# Patient Record
Sex: MALE | Race: WHITE | Employment: FULL TIME | URBAN - NONMETROPOLITAN AREA
[De-identification: names, ages, dates, MRNs, and addresses within clinical notes are randomized per-mention and may not be internally consistent; named-entity substitution may affect disease eponyms.]

---

## 2017-05-16 ENCOUNTER — OFFICE VISIT (OUTPATIENT)
Dept: PRIMARY CARE CLINIC | Age: 52
End: 2017-05-16
Payer: COMMERCIAL

## 2017-05-16 VITALS
WEIGHT: 256 LBS | BODY MASS INDEX: 34.67 KG/M2 | HEIGHT: 72 IN | OXYGEN SATURATION: 98 % | TEMPERATURE: 98.3 F | DIASTOLIC BLOOD PRESSURE: 88 MMHG | HEART RATE: 78 BPM | SYSTOLIC BLOOD PRESSURE: 138 MMHG

## 2017-05-16 DIAGNOSIS — I10 ESSENTIAL HYPERTENSION: ICD-10-CM

## 2017-05-16 DIAGNOSIS — Z00.00 ROUTINE PHYSICAL EXAMINATION: Primary | ICD-10-CM

## 2017-05-16 DIAGNOSIS — Z23 NEED FOR PNEUMOCOCCAL VACCINATION: ICD-10-CM

## 2017-05-16 DIAGNOSIS — Z12.11 SCREENING FOR COLON CANCER: ICD-10-CM

## 2017-05-16 DIAGNOSIS — N52.9 ERECTILE DYSFUNCTION, UNSPECIFIED ERECTILE DYSFUNCTION TYPE: ICD-10-CM

## 2017-05-16 DIAGNOSIS — Z72.0 TOBACCO USE: ICD-10-CM

## 2017-05-16 DIAGNOSIS — Z23 NEED FOR SHINGLES VACCINE: ICD-10-CM

## 2017-05-16 DIAGNOSIS — Z12.5 ENCOUNTER FOR PROSTATE CANCER SCREENING: ICD-10-CM

## 2017-05-16 PROCEDURE — 90732 PPSV23 VACC 2 YRS+ SUBQ/IM: CPT | Performed by: NURSE PRACTITIONER

## 2017-05-16 PROCEDURE — 99396 PREV VISIT EST AGE 40-64: CPT | Performed by: NURSE PRACTITIONER

## 2017-05-16 PROCEDURE — 90471 IMMUNIZATION ADMIN: CPT | Performed by: NURSE PRACTITIONER

## 2017-05-16 RX ORDER — LISINOPRIL AND HYDROCHLOROTHIAZIDE 20; 12.5 MG/1; MG/1
1 TABLET ORAL DAILY
Qty: 30 TABLET | Refills: 11 | Status: SHIPPED | OUTPATIENT
Start: 2017-05-16 | End: 2019-03-13 | Stop reason: SDUPTHER

## 2017-05-16 RX ORDER — SILDENAFIL 100 MG/1
100 TABLET, FILM COATED ORAL PRN
Qty: 10 TABLET | Refills: 3 | Status: SHIPPED | OUTPATIENT
Start: 2017-05-16 | End: 2020-04-09

## 2017-05-16 ASSESSMENT — ENCOUNTER SYMPTOMS
VOMITING: 0
SHORTNESS OF BREATH: 0
COUGH: 0
RHINORRHEA: 0
CONSTIPATION: 0
TROUBLE SWALLOWING: 0
DIARRHEA: 0
NAUSEA: 0
ABDOMINAL PAIN: 0
SORE THROAT: 0

## 2017-05-25 ENCOUNTER — TELEPHONE (OUTPATIENT)
Dept: GASTROENTEROLOGY | Age: 52
End: 2017-05-25

## 2017-06-02 ENCOUNTER — TELEPHONE (OUTPATIENT)
Dept: PRIMARY CARE CLINIC | Age: 52
End: 2017-06-02

## 2017-11-22 ENCOUNTER — OFFICE VISIT (OUTPATIENT)
Dept: PRIMARY CARE CLINIC | Age: 52
End: 2017-11-22
Payer: COMMERCIAL

## 2017-11-22 VITALS
SYSTOLIC BLOOD PRESSURE: 150 MMHG | DIASTOLIC BLOOD PRESSURE: 100 MMHG | BODY MASS INDEX: 35.62 KG/M2 | HEART RATE: 81 BPM | WEIGHT: 263 LBS | OXYGEN SATURATION: 96 % | HEIGHT: 72 IN | TEMPERATURE: 98.6 F

## 2017-11-22 DIAGNOSIS — J00 ACUTE NASOPHARYNGITIS: Primary | ICD-10-CM

## 2017-11-22 DIAGNOSIS — R50.9 FEVER, UNSPECIFIED FEVER CAUSE: ICD-10-CM

## 2017-11-22 LAB
INFLUENZA A ANTIBODY: NORMAL
INFLUENZA B ANTIBODY: NORMAL

## 2017-11-22 PROCEDURE — 99213 OFFICE O/P EST LOW 20 MIN: CPT | Performed by: NURSE PRACTITIONER

## 2017-11-22 PROCEDURE — 87804 INFLUENZA ASSAY W/OPTIC: CPT | Performed by: NURSE PRACTITIONER

## 2017-11-22 RX ORDER — GUAIFENESIN 600 MG/1
1200 TABLET, EXTENDED RELEASE ORAL 2 TIMES DAILY
Qty: 40 TABLET | Refills: 0 | Status: ON HOLD | OUTPATIENT
Start: 2017-11-22 | End: 2020-03-10

## 2017-11-22 RX ORDER — BENZONATATE 100 MG/1
100 CAPSULE ORAL 3 TIMES DAILY PRN
Qty: 30 CAPSULE | Refills: 0 | Status: SHIPPED | OUTPATIENT
Start: 2017-11-22 | End: 2017-11-29

## 2017-11-22 ASSESSMENT — ENCOUNTER SYMPTOMS
RHINORRHEA: 1
DIARRHEA: 0
SINUS PAIN: 0
SINUS PRESSURE: 0
COUGH: 1
CONSTIPATION: 0
SORE THROAT: 1
NAUSEA: 0
CHEST TIGHTNESS: 0
VOMITING: 0
ABDOMINAL PAIN: 0
SHORTNESS OF BREATH: 0
WHEEZING: 0

## 2017-11-22 NOTE — PROGRESS NOTES
[address]  [place], [address] [place] [address] Rd  Phone [phone]   Fax [phone]      OFFICE VISIT: 2017    Becca Echevarria- : 1965        Reason For Visit:  Emma Guzman is a 50 y.o. male who is here for Head Congestion (cough, chest congestion, drainage, nausea, sore throat, body aches, low grade fever. started . needs work excuse -)         HPI    Pt is here for head congestion  Started  night  Has facial and chest congestion with productive cough  Denies sinus pain/pressure  Has had chills and body aches. Felt as is there was fever but did not check  Has sore throat and post-nasal drip  Constant throughout the day  Has been fatigued and unable to work on Monday or Tuesday   Taken Nyquil and tylenol and has helped with sleep and headache. Denies flu shot this year  Current daily smoker-2 ppd  Both grandsons have been sick recently       height is 6' (1.829 m) and weight is 263 lb (119.3 kg). His temporal temperature is 98.6 °F (37 °C). His blood pressure is 150/100 (abnormal) and his pulse is 81. His oxygen saturation is 96%. Body mass index is 35.67 kg/m². Results for orders placed or performed in visit on 17   POCT Influenza A/B   Result Value Ref Range    Influenza A Ab neg     Influenza B Ab neg        I have reviewed the following with the Mr. Gordillo   Lab Review   No visits with results within 6 Month(s) from this visit. Latest known visit with results is:   No results found for any previous visit. Copies of these are in the chart. Prior to Visit Medications    Medication Sig Taking?  Authorizing Provider   benzonatate (TESSALON PERLES) 100 MG capsule Take 1 capsule by mouth 3 times daily as needed for Cough Yes IRMA Martinez   guaiFENesin (MUCINEX) 600 MG extended release tablet Take 2 tablets by mouth 2 times daily Yes IRMA Martinez   lisinopril-hydrochlorothiazide (PRINZIDE;ZESTORETIC) 20-12.5 MG per tablet Take 1 tablet by once an hour. This can help reduce swelling and throat pain. Use 1 teaspoon of salt mixed in 1 cup of warm water. · Do not smoke or allow others to smoke around you. If you need help quitting, talk to your doctor about stop-smoking programs and medicines. These can increase your chances of quitting for good. To avoid spreading the virus  · Cough or sneeze into a tissue. Then throw the tissue away. · If you don't have a tissue, use your hand to cover your cough or sneeze. Then clean your hand. You can also cough into your sleeve. · Wash your hands often. Use soap and warm water. Wash for 15 to 20 seconds each time. · If you don't have soap and water near you, you can clean your hands with alcohol wipes or gel. When should you call for help? Call your doctor now or seek immediate medical care if:  · You have a new or higher fever. · Your fever lasts more than 48 hours. · You have trouble breathing. · You have a fever with a stiff neck or a severe headache. · You are sensitive to light. · You feel very sleepy or confused. Watch closely for changes in your health, and be sure to contact your doctor if:  · You do not get better as expected. Where can you learn more? Go to [email or web address] org and sign in to your [employer] account. Enter K007 in the [facility] box to learn more about \"Viral Respiratory Infection: Care Instructions. \"     If you do not have an account, please click on the \"Sign Up Now\" link. Current as of: March 25, 2017  Content Version: 11.3  © 1759-1478 [employer]. Care instructions adapted under license by [facility] ([facility]). If you have questions about a medical condition or this instruction, always ask your healthcare professional. [place] [address] any warranty or liability for your use of this information. Controlled Substances Monitoring:           Additional Instructions: As always, patient is advised to bring in medication bottles in order to correctly reconcile with our current list.    Ana Paula Paredes received counseling on the following healthy behaviors: medication adherence    Patient given educational materials on dx    I have instructed Ana Paula Paredes to complete a self tracking handout on blood pressure and instructed them to bring it with them to his next appointment. Discussed use, benefit, and side effects of prescribed medications. Barriers to medication compliance addressed. All patient questions answered. Pt voiced understanding.      IRMA Castillo

## 2018-01-30 ENCOUNTER — OFFICE VISIT (OUTPATIENT)
Dept: PRIMARY CARE CLINIC | Age: 53
End: 2018-01-30
Payer: COMMERCIAL

## 2018-01-30 VITALS
WEIGHT: 258 LBS | HEIGHT: 72 IN | HEART RATE: 81 BPM | DIASTOLIC BLOOD PRESSURE: 104 MMHG | OXYGEN SATURATION: 98 % | TEMPERATURE: 98.1 F | BODY MASS INDEX: 34.95 KG/M2 | SYSTOLIC BLOOD PRESSURE: 166 MMHG

## 2018-01-30 DIAGNOSIS — K52.9 GASTROENTERITIS: Primary | ICD-10-CM

## 2018-01-30 DIAGNOSIS — I10 ESSENTIAL HYPERTENSION: ICD-10-CM

## 2018-01-30 PROCEDURE — 99213 OFFICE O/P EST LOW 20 MIN: CPT | Performed by: NURSE PRACTITIONER

## 2018-01-30 RX ORDER — LISINOPRIL AND HYDROCHLOROTHIAZIDE 25; 20 MG/1; MG/1
1 TABLET ORAL DAILY
Qty: 30 TABLET | Refills: 11 | Status: ON HOLD | OUTPATIENT
Start: 2018-01-30 | End: 2020-03-10

## 2018-01-30 RX ORDER — ONDANSETRON 4 MG/1
4 TABLET, ORALLY DISINTEGRATING ORAL EVERY 8 HOURS PRN
Qty: 12 TABLET | Refills: 0 | Status: SHIPPED | OUTPATIENT
Start: 2018-01-30 | End: 2020-03-18

## 2018-01-30 ASSESSMENT — ENCOUNTER SYMPTOMS
RHINORRHEA: 0
EYE DISCHARGE: 0
ABDOMINAL PAIN: 1
COUGH: 0
SINUS PAIN: 0

## 2018-01-30 NOTE — PROGRESS NOTES
Jacqu[address]  [place], [address] [place] [address] Rd  Phone [phone]   Fax [phone]      OFFICE VISIT: 2018    Heather Stover- : 1965        Reason For Visit:  Florentin Chambers is a 50 y.o. male who is here for Nausea & Vomiting (diarrhea. started yesteday. needs work excuse. low grade fever. )         HPI    Pt is here for nausea and vomiting that started  night and is gone today, diarrhea started yesterday. No fever that he knows of. Patient has taken Nyquil, ibuprofen and tylenol. Milvia Richards has been sick with the same symptoms. Here today to get a work excuse. No recent antibiotics or travel  Diarrhea started yesterday. No blood in stools or emesis  No fever that he is aware of but had sweats this mornin. height is 6' (1.829 m) and weight is 258 lb (117 kg). His temporal temperature is 98.1 °F (36.7 °C). His blood pressure is 166/104 (abnormal) and his pulse is 81. His oxygen saturation is 98%. Body mass index is 34.99 kg/m². Results for orders placed or performed in visit on 17   POCT Influenza A/B   Result Value Ref Range    Influenza A Ab neg     Influenza B Ab neg        I have reviewed the following with the Mr. Gail Bonilla    Lab Review   Office Visit on 2017   Component Date Value    Influenza A Ab 2017 neg     Influenza B Ab 2017 neg      Copies of these are in the chart. Prior to Visit Medications    Medication Sig Taking?  Authorizing Provider   lisinopril-hydrochlorothiazide (PRINZIDE;ZESTORETIC) 20-25 MG per tablet Take 1 tablet by mouth daily Yes IRMA De La Cruz   ondansetron (ZOFRAN ODT) 4 MG disintegrating tablet Take 1 tablet by mouth every 8 hours as needed for Nausea or Vomiting Yes IRMA De La Cruz   lisinopril-hydrochlorothiazide (PRINZIDE;ZESTORETIC) 20-12.5 MG per tablet Take 1 tablet by mouth daily Yes IRMA De La Cruz   sildenafil (VIAGRA) 100 MG tablet Take 1 tablet by mouth as needed for Erectile Dysfunction Yes IRMA Soria   guaiFENesin ([address]) 600 MG extended release tablet Take 2 tablets by mouth 2 times daily  IRMA Howell       Allergies: Codeine    Past Medical History:   Diagnosis Date    Chest pain     Chronic back pain     ED (erectile dysfunction)     GERD (gastroesophageal reflux disease)     Hypertension     IBS (irritable bowel syndrome)     Tobacco abuse        Past Surgical History:   Procedure Laterality Date    TONSILLECTOMY AND ADENOIDECTOMY         Social History   Substance Use Topics    Smoking status: Current Every Day Smoker     Packs/day: 2.00    Smokeless tobacco: Former User    Alcohol use Yes      Comment: ocassionaly       Review of Systems   Constitutional: Positive for appetite change. Negative for fever. HENT: Negative for rhinorrhea and sinus pain. Eyes: Negative for discharge. Respiratory: Negative for cough. Cardiovascular: Negative for chest pain. Gastrointestinal: Positive for abdominal pain. Genitourinary: Negative for dysuria. Neurological: Negative for numbness and headaches. Physical Exam   Constitutional: He is oriented to person, place, and time. He appears well-developed and well-nourished. HENT:   Head: Normocephalic. Right Ear: Tympanic membrane, external ear and ear canal normal.   Left Ear: Tympanic membrane, external ear and ear canal normal.   Nose: Nose normal.   Mouth/Throat: Oropharynx is clear and moist and mucous membranes are normal.   Eyes: Pupils are equal, round, and reactive to light. Cardiovascular: Normal rate and regular rhythm. Pulmonary/Chest: Effort normal and breath sounds normal.   Abdominal: Soft. Bowel sounds are normal. There is no tenderness. Neurological: He is alert and oriented to person, place, and time. Skin: Skin is warm and dry. ASSESSMENT      ICD-10-CM ICD-9-CM    1.  Gastroenteritis K52.9 558.9 ondansetron (ZOFRAN ODT) 4 MG disintegrating

## 2019-03-13 DIAGNOSIS — I10 ESSENTIAL HYPERTENSION: ICD-10-CM

## 2019-03-13 RX ORDER — LISINOPRIL AND HYDROCHLOROTHIAZIDE 20; 12.5 MG/1; MG/1
1 TABLET ORAL DAILY
Qty: 30 TABLET | Refills: 11 | Status: SHIPPED | OUTPATIENT
Start: 2019-03-13 | End: 2020-03-26 | Stop reason: SDUPTHER

## 2020-03-09 ENCOUNTER — HOSPITAL ENCOUNTER (INPATIENT)
Age: 55
LOS: 2 days | Discharge: HOME | DRG: 948 | End: 2020-03-11
Attending: FAMILY MEDICINE | Admitting: HOSPITALIST
Payer: COMMERCIAL

## 2020-03-09 ENCOUNTER — APPOINTMENT (OUTPATIENT)
Dept: GENERAL RADIOLOGY | Age: 55
DRG: 948 | End: 2020-03-09
Attending: FAMILY MEDICINE
Payer: COMMERCIAL

## 2020-03-09 PROBLEM — F17.219 CIGARETTE NICOTINE DEPENDENCE WITH NICOTINE-INDUCED DISORDER: Status: ACTIVE | Noted: 2020-03-09

## 2020-03-09 PROBLEM — R77.8 ELEVATED TROPONIN I LEVEL: Status: ACTIVE | Noted: 2020-03-09

## 2020-03-09 PROBLEM — I21.4 NSTEMI (NON-ST ELEVATED MYOCARDIAL INFARCTION) (HCC): Status: ACTIVE | Noted: 2020-03-09

## 2020-03-09 PROBLEM — R79.89 ELEVATED TROPONIN I LEVEL: Status: ACTIVE | Noted: 2020-03-09

## 2020-03-09 PROBLEM — Z71.6 TOBACCO ABUSE COUNSELING: Status: ACTIVE | Noted: 2020-03-09

## 2020-03-09 PROBLEM — Z91.199 PERSONAL HISTORY OF NONCOMPLIANCE WITH MEDICAL TREATMENT AND REGIMEN: Status: ACTIVE | Noted: 2020-03-09

## 2020-03-09 LAB
APTT: 27.1 SEC (ref 26–36.2)
HCT VFR BLD CALC: 43 % (ref 42–52)
HEMOGLOBIN: 14.5 G/DL (ref 14–18)
MCH RBC QN AUTO: 31.6 PG (ref 27–31)
MCHC RBC AUTO-ENTMCNC: 33.7 G/DL (ref 33–37)
MCV RBC AUTO: 93.7 FL (ref 80–94)
PDW BLD-RTO: 13.1 % (ref 11.5–14.5)
PLATELET # BLD: 188 K/UL (ref 130–400)
PMV BLD AUTO: 9.4 FL (ref 9.4–12.4)
RBC # BLD: 4.59 M/UL (ref 4.7–6.1)
TROPONIN: <0.01 NG/ML (ref 0–0.03)
WBC # BLD: 9.5 K/UL (ref 4.8–10.8)

## 2020-03-09 PROCEDURE — 36415 COLL VENOUS BLD VENIPUNCTURE: CPT

## 2020-03-09 PROCEDURE — 84484 ASSAY OF TROPONIN QUANT: CPT

## 2020-03-09 PROCEDURE — 2140000000 HC CCU INTERMEDIATE R&B

## 2020-03-09 PROCEDURE — 6370000000 HC RX 637 (ALT 250 FOR IP): Performed by: HOSPITALIST

## 2020-03-09 PROCEDURE — 71046 X-RAY EXAM CHEST 2 VIEWS: CPT

## 2020-03-09 PROCEDURE — 85730 THROMBOPLASTIN TIME PARTIAL: CPT

## 2020-03-09 PROCEDURE — 6360000002 HC RX W HCPCS: Performed by: HOSPITALIST

## 2020-03-09 PROCEDURE — 85027 COMPLETE CBC AUTOMATED: CPT

## 2020-03-09 PROCEDURE — 2580000003 HC RX 258: Performed by: HOSPITALIST

## 2020-03-09 RX ORDER — ASPIRIN 81 MG/1
81 TABLET, CHEWABLE ORAL DAILY
Status: DISCONTINUED | OUTPATIENT
Start: 2020-03-10 | End: 2020-03-11 | Stop reason: HOSPADM

## 2020-03-09 RX ORDER — ATORVASTATIN CALCIUM 40 MG/1
40 TABLET, FILM COATED ORAL NIGHTLY
Status: DISCONTINUED | OUTPATIENT
Start: 2020-03-09 | End: 2020-03-11 | Stop reason: HOSPADM

## 2020-03-09 RX ORDER — LISINOPRIL 20 MG/1
20 TABLET ORAL DAILY
Status: DISCONTINUED | OUTPATIENT
Start: 2020-03-09 | End: 2020-03-11 | Stop reason: HOSPADM

## 2020-03-09 RX ORDER — SODIUM CHLORIDE 0.9 % (FLUSH) 0.9 %
10 SYRINGE (ML) INJECTION EVERY 12 HOURS SCHEDULED
Status: DISCONTINUED | OUTPATIENT
Start: 2020-03-09 | End: 2020-03-11 | Stop reason: HOSPADM

## 2020-03-09 RX ORDER — ONDANSETRON 2 MG/ML
4 INJECTION INTRAMUSCULAR; INTRAVENOUS EVERY 6 HOURS PRN
Status: DISCONTINUED | OUTPATIENT
Start: 2020-03-09 | End: 2020-03-11 | Stop reason: HOSPADM

## 2020-03-09 RX ORDER — ACETAMINOPHEN 650 MG/1
650 SUPPOSITORY RECTAL EVERY 6 HOURS PRN
Status: DISCONTINUED | OUTPATIENT
Start: 2020-03-09 | End: 2020-03-11 | Stop reason: HOSPADM

## 2020-03-09 RX ORDER — PROMETHAZINE HYDROCHLORIDE 25 MG/1
12.5 TABLET ORAL EVERY 6 HOURS PRN
Status: DISCONTINUED | OUTPATIENT
Start: 2020-03-09 | End: 2020-03-11 | Stop reason: HOSPADM

## 2020-03-09 RX ORDER — HEPARIN SODIUM 1000 [USP'U]/ML
60 INJECTION, SOLUTION INTRAVENOUS; SUBCUTANEOUS ONCE
Status: COMPLETED | OUTPATIENT
Start: 2020-03-09 | End: 2020-03-09

## 2020-03-09 RX ORDER — POTASSIUM CHLORIDE 7.45 MG/ML
10 INJECTION INTRAVENOUS PRN
Status: DISCONTINUED | OUTPATIENT
Start: 2020-03-09 | End: 2020-03-11 | Stop reason: HOSPADM

## 2020-03-09 RX ORDER — NICOTINE 21 MG/24HR
1 PATCH, TRANSDERMAL 24 HOURS TRANSDERMAL DAILY
Status: DISCONTINUED | OUTPATIENT
Start: 2020-03-09 | End: 2020-03-11 | Stop reason: HOSPADM

## 2020-03-09 RX ORDER — SODIUM CHLORIDE 9 MG/ML
INJECTION, SOLUTION INTRAVENOUS CONTINUOUS
Status: ACTIVE | OUTPATIENT
Start: 2020-03-09 | End: 2020-03-10

## 2020-03-09 RX ORDER — LISINOPRIL AND HYDROCHLOROTHIAZIDE 25; 20 MG/1; MG/1
1 TABLET ORAL DAILY
Status: DISCONTINUED | OUTPATIENT
Start: 2020-03-09 | End: 2020-03-09 | Stop reason: CLARIF

## 2020-03-09 RX ORDER — GUAIFENESIN 600 MG/1
1200 TABLET, EXTENDED RELEASE ORAL 2 TIMES DAILY
Status: DISCONTINUED | OUTPATIENT
Start: 2020-03-09 | End: 2020-03-11 | Stop reason: HOSPADM

## 2020-03-09 RX ORDER — POLYETHYLENE GLYCOL 3350 17 G/17G
17 POWDER, FOR SOLUTION ORAL DAILY PRN
Status: DISCONTINUED | OUTPATIENT
Start: 2020-03-09 | End: 2020-03-11 | Stop reason: HOSPADM

## 2020-03-09 RX ORDER — ACETAMINOPHEN 325 MG/1
650 TABLET ORAL EVERY 6 HOURS PRN
Status: DISCONTINUED | OUTPATIENT
Start: 2020-03-09 | End: 2020-03-11 | Stop reason: HOSPADM

## 2020-03-09 RX ORDER — HYDROCHLOROTHIAZIDE 50 MG/1
25 TABLET ORAL DAILY
Status: DISCONTINUED | OUTPATIENT
Start: 2020-03-09 | End: 2020-03-11 | Stop reason: HOSPADM

## 2020-03-09 RX ORDER — HEPARIN SODIUM 1000 [USP'U]/ML
4000 INJECTION, SOLUTION INTRAVENOUS; SUBCUTANEOUS PRN
Status: DISCONTINUED | OUTPATIENT
Start: 2020-03-09 | End: 2020-03-10

## 2020-03-09 RX ORDER — HEPARIN SODIUM 10000 [USP'U]/100ML
12 INJECTION, SOLUTION INTRAVENOUS CONTINUOUS
Status: DISCONTINUED | OUTPATIENT
Start: 2020-03-09 | End: 2020-03-10

## 2020-03-09 RX ORDER — FAMOTIDINE 20 MG/1
20 TABLET, FILM COATED ORAL 2 TIMES DAILY
Status: DISCONTINUED | OUTPATIENT
Start: 2020-03-09 | End: 2020-03-11 | Stop reason: HOSPADM

## 2020-03-09 RX ORDER — POTASSIUM CHLORIDE 20 MEQ/1
40 TABLET, EXTENDED RELEASE ORAL PRN
Status: DISCONTINUED | OUTPATIENT
Start: 2020-03-09 | End: 2020-03-11 | Stop reason: HOSPADM

## 2020-03-09 RX ORDER — SODIUM CHLORIDE 0.9 % (FLUSH) 0.9 %
10 SYRINGE (ML) INJECTION PRN
Status: DISCONTINUED | OUTPATIENT
Start: 2020-03-09 | End: 2020-03-11 | Stop reason: HOSPADM

## 2020-03-09 RX ORDER — MAGNESIUM SULFATE 1 G/100ML
1 INJECTION INTRAVENOUS PRN
Status: DISCONTINUED | OUTPATIENT
Start: 2020-03-09 | End: 2020-03-11 | Stop reason: HOSPADM

## 2020-03-09 RX ORDER — HEPARIN SODIUM 1000 [USP'U]/ML
2000 INJECTION, SOLUTION INTRAVENOUS; SUBCUTANEOUS PRN
Status: DISCONTINUED | OUTPATIENT
Start: 2020-03-09 | End: 2020-03-10

## 2020-03-09 RX ADMIN — SODIUM CHLORIDE: 9 INJECTION, SOLUTION INTRAVENOUS at 21:52

## 2020-03-09 RX ADMIN — HEPARIN SODIUM 12 UNITS/KG/HR: 10000 INJECTION, SOLUTION INTRAVENOUS at 23:24

## 2020-03-09 RX ADMIN — HEPARIN SODIUM 5760 UNITS: 1000 INJECTION INTRAVENOUS; SUBCUTANEOUS at 23:23

## 2020-03-09 RX ADMIN — ATORVASTATIN CALCIUM 40 MG: 40 TABLET, FILM COATED ORAL at 21:59

## 2020-03-09 RX ADMIN — LISINOPRIL 20 MG: 20 TABLET ORAL at 22:00

## 2020-03-09 RX ADMIN — FAMOTIDINE 20 MG: 20 TABLET ORAL at 22:00

## 2020-03-09 RX ADMIN — SODIUM CHLORIDE, PRESERVATIVE FREE 10 ML: 5 INJECTION INTRAVENOUS at 22:06

## 2020-03-09 RX ADMIN — HYDROCHLOROTHIAZIDE 25 MG: 50 TABLET ORAL at 22:00

## 2020-03-09 RX ADMIN — NITROGLYCERIN 0.5 INCH: 20 OINTMENT TOPICAL at 22:02

## 2020-03-09 RX ADMIN — ACETAMINOPHEN 650 MG: 325 TABLET ORAL at 22:00

## 2020-03-09 ASSESSMENT — PAIN - FUNCTIONAL ASSESSMENT: PAIN_FUNCTIONAL_ASSESSMENT: ACTIVITIES ARE NOT PREVENTED

## 2020-03-09 ASSESSMENT — PAIN DESCRIPTION - DESCRIPTORS
DESCRIPTORS: STABBING
DESCRIPTORS: ACHING;STABBING

## 2020-03-09 ASSESSMENT — PAIN SCALES - GENERAL
PAINLEVEL_OUTOF10: 3
PAINLEVEL_OUTOF10: 0
PAINLEVEL_OUTOF10: 4

## 2020-03-09 ASSESSMENT — PAIN DESCRIPTION - ORIENTATION
ORIENTATION: RIGHT;MID
ORIENTATION: MID

## 2020-03-09 ASSESSMENT — PAIN DESCRIPTION - PAIN TYPE
TYPE: ACUTE PAIN
TYPE: ACUTE PAIN

## 2020-03-09 ASSESSMENT — PAIN DESCRIPTION - PROGRESSION: CLINICAL_PROGRESSION: NOT CHANGED

## 2020-03-09 ASSESSMENT — PAIN DESCRIPTION - LOCATION
LOCATION: BACK
LOCATION: BACK

## 2020-03-09 ASSESSMENT — PAIN DESCRIPTION - ONSET
ONSET: ON-GOING
ONSET: ON-GOING

## 2020-03-09 ASSESSMENT — PAIN DESCRIPTION - FREQUENCY
FREQUENCY: CONTINUOUS
FREQUENCY: INTERMITTENT

## 2020-03-09 NOTE — PROGRESS NOTES
Sotero Motley arrived to room # [phone]. Presented with: Right mid back and flank pain; elevated troponin  Mental Status: Patient is oriented and alert. There were no vitals filed for this visit. Patient safety contract and falls prevention contract reviewed with patient No.  Oriented Patient to room. Call light within reach. Yes.   Needs, issues or concerns expressed at this time: no.      Electronically signed by Juan Steve RN on 3/9/2020 at 6:30 PM

## 2020-03-09 NOTE — PROGRESS NOTES
4 Eyes Skin Assessment    Cara Ridkarli is being assessed upon: Admission    I agree that I, Kaylee Quach, along with Yamilka Pompa (either 2 RN's or 1 LPN and 1 RN) have performed a thorough Head to Toe Skin Assessment on the patient. ALL assessment sites listed below have been assessed. Areas assessed by both nurses:     [x]   Head, Face, and Ears   [x]   Shoulders, Back, and Chest  [x]   Arms, Elbows, and Hands   [x]   Coccyx, Sacrum, and Ischium  [x]   Legs, Feet, and Heels    Does the Patient have Skin Breakdown?  No    Jonathan Prevention initiated: No  Wound Care Orders initiated: No    [facility] nurse consulted for Pressure Injury (Stage 3,4, Unstageable, DTI, NWPT, and Complex wounds) and New or Established Ostomies: No        Primary Nurse eSignature: Kaylee Quach RN on 3/9/2020 at 6:31 PM      Co-Signer eSignature: Electronically signed by Irma Rick RN on 3/10/20 at 2:08 AM CDT

## 2020-03-10 ENCOUNTER — APPOINTMENT (OUTPATIENT)
Dept: MRI IMAGING | Age: 55
DRG: 948 | End: 2020-03-10
Attending: FAMILY MEDICINE
Payer: COMMERCIAL

## 2020-03-10 ENCOUNTER — APPOINTMENT (OUTPATIENT)
Dept: NUCLEAR MEDICINE | Age: 55
DRG: 948 | End: 2020-03-10
Attending: FAMILY MEDICINE
Payer: COMMERCIAL

## 2020-03-10 ENCOUNTER — APPOINTMENT (OUTPATIENT)
Dept: CT IMAGING | Age: 55
DRG: 948 | End: 2020-03-10
Attending: FAMILY MEDICINE
Payer: COMMERCIAL

## 2020-03-10 PROBLEM — M51.379 DDD (DEGENERATIVE DISC DISEASE), LUMBOSACRAL: Status: ACTIVE | Noted: 2020-03-10

## 2020-03-10 PROBLEM — M51.37 DDD (DEGENERATIVE DISC DISEASE), LUMBOSACRAL: Status: ACTIVE | Noted: 2020-03-10

## 2020-03-10 LAB
ANION GAP SERPL CALCULATED.3IONS-SCNC: 13 MMOL/L (ref 7–19)
APTT: 25.5 SEC (ref 26–36.2)
APTT: 27.4 SEC (ref 26–36.2)
APTT: 50.3 SEC (ref 26–36.2)
APTT: 71.3 SEC (ref 26–36.2)
BASOPHILS ABSOLUTE: 0.1 K/UL (ref 0–0.2)
BASOPHILS RELATIVE PERCENT: 1 % (ref 0–1)
BUN BLDV-MCNC: 11 MG/DL (ref 6–20)
CALCIUM SERPL-MCNC: 9.3 MG/DL (ref 8.6–10)
CHLORIDE BLD-SCNC: 108 MMOL/L (ref 98–111)
CHOLESTEROL, TOTAL: 150 MG/DL (ref 160–199)
CO2: 23 MMOL/L (ref 22–29)
CREAT SERPL-MCNC: 0.8 MG/DL (ref 0.5–1.2)
EKG P AXIS: 62 DEGREES
EKG P-R INTERVAL: 130 MS
EKG Q-T INTERVAL: 446 MS
EKG QRS DURATION: 104 MS
EKG QTC CALCULATION (BAZETT): 431 MS
EKG T AXIS: 62 DEGREES
EOSINOPHILS ABSOLUTE: 0.3 K/UL (ref 0–0.6)
EOSINOPHILS RELATIVE PERCENT: 3.1 % (ref 0–5)
GFR NON-AFRICAN AMERICAN: >60
GLUCOSE BLD-MCNC: 109 MG/DL (ref 74–109)
HCT VFR BLD CALC: 41.9 % (ref 42–52)
HDLC SERPL-MCNC: 57 MG/DL (ref 55–121)
HEMOGLOBIN: 13.8 G/DL (ref 14–18)
IMMATURE GRANULOCYTES #: 0 K/UL
LDL CHOLESTEROL CALCULATED: 48 MG/DL
LYMPHOCYTES ABSOLUTE: 2.9 K/UL (ref 1.1–4.5)
LYMPHOCYTES RELATIVE PERCENT: 35 % (ref 20–40)
MAGNESIUM: 2 MG/DL (ref 1.6–2.6)
MCH RBC QN AUTO: 31 PG (ref 27–31)
MCHC RBC AUTO-ENTMCNC: 32.9 G/DL (ref 33–37)
MCV RBC AUTO: 94.2 FL (ref 80–94)
MONOCYTES ABSOLUTE: 0.7 K/UL (ref 0–0.9)
MONOCYTES RELATIVE PERCENT: 7.9 % (ref 0–10)
NEUTROPHILS ABSOLUTE: 4.4 K/UL (ref 1.5–7.5)
NEUTROPHILS RELATIVE PERCENT: 52.8 % (ref 50–65)
PDW BLD-RTO: 13 % (ref 11.5–14.5)
PHOSPHORUS: 3.6 MG/DL (ref 2.5–4.5)
PLATELET # BLD: 176 K/UL (ref 130–400)
PMV BLD AUTO: 9.9 FL (ref 9.4–12.4)
POTASSIUM SERPL-SCNC: 4 MMOL/L (ref 3.5–5)
RBC # BLD: 4.45 M/UL (ref 4.7–6.1)
SODIUM BLD-SCNC: 144 MMOL/L (ref 136–145)
TRIGL SERPL-MCNC: 223 MG/DL (ref 0–149)
TROPONIN: <0.01 NG/ML (ref 0–0.03)
TROPONIN: <0.01 NG/ML (ref 0–0.03)
WBC # BLD: 8.4 K/UL (ref 4.8–10.8)

## 2020-03-10 PROCEDURE — 2580000003 HC RX 258: Performed by: HOSPITALIST

## 2020-03-10 PROCEDURE — 6370000000 HC RX 637 (ALT 250 FOR IP): Performed by: INTERNAL MEDICINE

## 2020-03-10 PROCEDURE — 74175 CTA ABDOMEN W/CONTRAST: CPT

## 2020-03-10 PROCEDURE — 3430000000 HC RX DIAGNOSTIC RADIOPHARMACEUTICAL: Performed by: INTERNAL MEDICINE

## 2020-03-10 PROCEDURE — 93010 ELECTROCARDIOGRAM REPORT: CPT | Performed by: INTERNAL MEDICINE

## 2020-03-10 PROCEDURE — 99221 1ST HOSP IP/OBS SF/LOW 40: CPT | Performed by: INTERNAL MEDICINE

## 2020-03-10 PROCEDURE — 93005 ELECTROCARDIOGRAM TRACING: CPT | Performed by: HOSPITALIST

## 2020-03-10 PROCEDURE — 71275 CT ANGIOGRAPHY CHEST: CPT

## 2020-03-10 PROCEDURE — 80061 LIPID PANEL: CPT

## 2020-03-10 PROCEDURE — 84484 ASSAY OF TROPONIN QUANT: CPT

## 2020-03-10 PROCEDURE — 2140000000 HC CCU INTERMEDIATE R&B

## 2020-03-10 PROCEDURE — 6370000000 HC RX 637 (ALT 250 FOR IP): Performed by: HOSPITALIST

## 2020-03-10 PROCEDURE — 85025 COMPLETE CBC W/AUTO DIFF WBC: CPT

## 2020-03-10 PROCEDURE — 6360000004 HC RX CONTRAST MEDICATION: Performed by: INTERNAL MEDICINE

## 2020-03-10 PROCEDURE — 80048 BASIC METABOLIC PNL TOTAL CA: CPT

## 2020-03-10 PROCEDURE — 36415 COLL VENOUS BLD VENIPUNCTURE: CPT

## 2020-03-10 PROCEDURE — 72148 MRI LUMBAR SPINE W/O DYE: CPT

## 2020-03-10 PROCEDURE — A9500 TC99M SESTAMIBI: HCPCS | Performed by: INTERNAL MEDICINE

## 2020-03-10 PROCEDURE — 84100 ASSAY OF PHOSPHORUS: CPT

## 2020-03-10 PROCEDURE — 85730 THROMBOPLASTIN TIME PARTIAL: CPT

## 2020-03-10 PROCEDURE — 6360000002 HC RX W HCPCS: Performed by: HOSPITALIST

## 2020-03-10 PROCEDURE — 78452 HT MUSCLE IMAGE SPECT MULT: CPT

## 2020-03-10 PROCEDURE — 83735 ASSAY OF MAGNESIUM: CPT

## 2020-03-10 RX ORDER — AMLODIPINE BESYLATE 5 MG/1
5 TABLET ORAL 2 TIMES DAILY
Status: DISCONTINUED | OUTPATIENT
Start: 2020-03-10 | End: 2020-03-11 | Stop reason: HOSPADM

## 2020-03-10 RX ORDER — OXYCODONE HYDROCHLORIDE AND ACETAMINOPHEN 5; 325 MG/1; MG/1
1 TABLET ORAL EVERY 4 HOURS PRN
Status: DISCONTINUED | OUTPATIENT
Start: 2020-03-10 | End: 2020-03-11 | Stop reason: HOSPADM

## 2020-03-10 RX ORDER — HYDRALAZINE HYDROCHLORIDE 20 MG/ML
10 INJECTION INTRAMUSCULAR; INTRAVENOUS ONCE
Status: COMPLETED | OUTPATIENT
Start: 2020-03-10 | End: 2020-03-10

## 2020-03-10 RX ADMIN — OXYCODONE HYDROCHLORIDE AND ACETAMINOPHEN 1 TABLET: 5; 325 TABLET ORAL at 08:11

## 2020-03-10 RX ADMIN — OXYCODONE HYDROCHLORIDE AND ACETAMINOPHEN 1 TABLET: 5; 325 TABLET ORAL at 17:24

## 2020-03-10 RX ADMIN — LISINOPRIL 20 MG: 20 TABLET ORAL at 08:11

## 2020-03-10 RX ADMIN — AMLODIPINE BESYLATE 5 MG: 5 TABLET ORAL at 21:22

## 2020-03-10 RX ADMIN — TETRAKIS(2-METHOXYISOBUTYLISOCYANIDE)COPPER(I) TETRAFLUOROBORATE 30 MILLICURIE: 1 INJECTION, POWDER, LYOPHILIZED, FOR SOLUTION INTRAVENOUS at 12:41

## 2020-03-10 RX ADMIN — GUAIFENESIN 1200 MG: 600 TABLET, EXTENDED RELEASE ORAL at 08:12

## 2020-03-10 RX ADMIN — FAMOTIDINE 20 MG: 20 TABLET ORAL at 08:11

## 2020-03-10 RX ADMIN — FAMOTIDINE 20 MG: 20 TABLET ORAL at 21:23

## 2020-03-10 RX ADMIN — NITROGLYCERIN 0.5 INCH: 20 OINTMENT TOPICAL at 05:12

## 2020-03-10 RX ADMIN — GUAIFENESIN 1200 MG: 600 TABLET, EXTENDED RELEASE ORAL at 21:22

## 2020-03-10 RX ADMIN — TETRAKIS(2-METHOXYISOBUTYLISOCYANIDE)COPPER(I) TETRAFLUOROBORATE 10 MILLICURIE: 1 INJECTION, POWDER, LYOPHILIZED, FOR SOLUTION INTRAVENOUS at 12:41

## 2020-03-10 RX ADMIN — IOPAMIDOL 90 ML: 755 INJECTION, SOLUTION INTRAVENOUS at 13:18

## 2020-03-10 RX ADMIN — OXYCODONE HYDROCHLORIDE AND ACETAMINOPHEN 1 TABLET: 5; 325 TABLET ORAL at 12:33

## 2020-03-10 RX ADMIN — HYDROCHLOROTHIAZIDE 25 MG: 50 TABLET ORAL at 08:12

## 2020-03-10 RX ADMIN — HYDRALAZINE HYDROCHLORIDE 10 MG: 20 INJECTION INTRAMUSCULAR; INTRAVENOUS at 19:02

## 2020-03-10 RX ADMIN — OXYCODONE HYDROCHLORIDE AND ACETAMINOPHEN 1 TABLET: 5; 325 TABLET ORAL at 21:24

## 2020-03-10 RX ADMIN — SODIUM CHLORIDE, PRESERVATIVE FREE 10 ML: 5 INJECTION INTRAVENOUS at 21:23

## 2020-03-10 RX ADMIN — ACETAMINOPHEN 650 MG: 325 TABLET ORAL at 05:12

## 2020-03-10 RX ADMIN — ATORVASTATIN CALCIUM 40 MG: 40 TABLET, FILM COATED ORAL at 21:23

## 2020-03-10 RX ADMIN — ASPIRIN 81 MG 81 MG: 81 TABLET ORAL at 08:12

## 2020-03-10 RX ADMIN — AMLODIPINE BESYLATE 5 MG: 5 TABLET ORAL at 08:12

## 2020-03-10 ASSESSMENT — PAIN SCALES - GENERAL
PAINLEVEL_OUTOF10: 0
PAINLEVEL_OUTOF10: 5
PAINLEVEL_OUTOF10: 0
PAINLEVEL_OUTOF10: 1
PAINLEVEL_OUTOF10: 5
PAINLEVEL_OUTOF10: 8
PAINLEVEL_OUTOF10: 0
PAINLEVEL_OUTOF10: 7
PAINLEVEL_OUTOF10: 0
PAINLEVEL_OUTOF10: 5
PAINLEVEL_OUTOF10: 2
PAINLEVEL_OUTOF10: 1
PAINLEVEL_OUTOF10: 3

## 2020-03-10 ASSESSMENT — PAIN DESCRIPTION - LOCATION
LOCATION: BACK

## 2020-03-10 ASSESSMENT — PAIN DESCRIPTION - ONSET
ONSET: ON-GOING
ONSET: ON-GOING

## 2020-03-10 ASSESSMENT — PAIN DESCRIPTION - PAIN TYPE
TYPE: ACUTE PAIN

## 2020-03-10 ASSESSMENT — ENCOUNTER SYMPTOMS
SHORTNESS OF BREATH: 0
DIARRHEA: 0
WHEEZING: 0
COUGH: 0
ABDOMINAL DISTENTION: 0
VOMITING: 0
BACK PAIN: 1
ABDOMINAL PAIN: 0
BLOOD IN STOOL: 0

## 2020-03-10 ASSESSMENT — PAIN DESCRIPTION - PROGRESSION
CLINICAL_PROGRESSION: NOT CHANGED
CLINICAL_PROGRESSION: NOT CHANGED

## 2020-03-10 ASSESSMENT — PAIN DESCRIPTION - ORIENTATION
ORIENTATION: RIGHT;LOWER

## 2020-03-10 ASSESSMENT — PAIN DESCRIPTION - FREQUENCY
FREQUENCY: CONTINUOUS
FREQUENCY: CONTINUOUS

## 2020-03-10 ASSESSMENT — PAIN DESCRIPTION - DESCRIPTORS
DESCRIPTORS: CONSTANT
DESCRIPTORS: CONSTANT

## 2020-03-10 NOTE — PLAN OF CARE
Problem: Pain:  Goal: Pain level will decrease  Description: Pain level will decrease  Outcome: Ongoing  Goal: Control of acute pain  Description: Control of acute pain  Outcome: Ongoing  Goal: Control of chronic pain  Description: Control of chronic pain  Outcome: Ongoing     Problem: Falls - Risk of:  Goal: Will remain free from falls  Description: Will remain free from falls  Outcome: Ongoing  Goal: Absence of physical injury  Description: Absence of physical injury  Outcome: Ongoing     Problem: Discharge Planning:  Goal: Participates in care planning  Description: Participates in care planning  Outcome: Ongoing  Goal: Discharged to appropriate level of care  Description: Discharged to appropriate level of care  Outcome: Ongoing     Problem:  Activity Intolerance:  Goal: Ability to tolerate increased activity will improve  Description: Ability to tolerate increased activity will improve  Outcome: Ongoing     Problem: Pain:  Goal: Ability to notify healthcare provider of pain before it becomes unmanageable or unbearable will improve  Description: Ability to notify healthcare provider of pain before it becomes unmanageable or unbearable will improve  Outcome: Ongoing  Goal: Control of acute pain  Description: Control of acute pain  Outcome: Ongoing  Goal: Control of chronic pain  Description: Control of chronic pain  Outcome: Ongoing     Problem: Cardiac:  Goal: Ability to maintain an adequate cardiac output will improve  Description: Ability to maintain an adequate cardiac output will improve  Outcome: Ongoing  Goal: Complications related to the disease process, condition or treatment will be avoided or minimized  Description: Complications related to the disease process, condition or treatment will be avoided or minimized  Outcome: Ongoing  Goal: Hemodynamic stability will improve  Description: Hemodynamic stability will improve  Outcome: Ongoing  Goal: Risk factors for ineffective tissue perfusion will

## 2020-03-10 NOTE — H&P
Erectile Dysfunction     REVIEW OF SYSTEMS:  Constitutional:  No fevers, chills, nausea, vomiting, + tiredness and fatigue   Head:  No head injury, facial trauma   Eyes:  No acute visual changes, exudate, trauma   Ears:  No acute hearing loss, earaches   Nose: No nasal discharge, epistaxis   Neck: No new hoarseness, voice change, or new masses   Lungs:   No hemoptysis, pleurisy   Heart:  +chest pressure with exertion, no palpitations,    Abdomen:   No new masses, no bright red blood per rectum   Extremities: No acute pain while ambulating, no new lesions   Skin: No new changes in skin color, no rashes or lesions   Neurologic: No new motor or sensory changes       PHYSICAL EXAM:    PHYSICAL EXAMINATION:    Vital Signs: Please see the chart   DWIGHT:  Awake, alert, oriented x 3, patient appears tired and fatigued   Head/Eyes:  Normocephalic, atraumatic, EOMI and PERRLA bilaterally   ENT: Moist mucous membranes, nasal passages clear   Neck: Supple, full range of motion, no carotid bruit, trachea midline   Respiratory:   Bilateral decreased air entry in both lung fields, mild B/L crackles, symmetric expansion of chest   Cardiovascular:  Regular rate and rhythm, S1+S2+0, no murmurs/rubs   Urology: No bilateral CVA tenderness, no suprapubic tenderness   Abdomen:   Soft, non-tender, bowel sounds +ve, no organomegaly   Muscle/Joints: Moves all, full range of motion, no muscle spasms   Extremities: No clubbing, no cyanosis, no calf tenderness, no edema   Pulses: 2+ bilaterally, symmetrical   Skin: Warm, dry, no pallor/cyanosis/jaundice, no rashes/lesions   Neurologic: Awake, alert, oriented x 3, cranial nerves II-XII intact, no focal neurological deficits, sensory system intact   Psychiatric: Normal mood, non-suicidal         LABORATORY DATA:    CBC:No results for input(s): WBC, HGB, HCT, PLT in the last 72 hours. BMP:No results for input(s): NA, K, CL, CO2, BUN, CREATININE, CALCIUM, PHOS in the last 72 hours.     Invalid input(s): MAGNESNo results for input(s): AST, ALT, BILIDIR, BILITOT, ALKPHOS in the last 72 hours. Coag Panel: No results for input(s): INR, PROTIME, APTT in the last 72 hours. Cardiac Enzymes:   Recent Labs     03/09/20  1857   TROPONINI <0.01     ABGs:No results found for: PHART, PO2ART, QTN2IRH  Urinalysis:No results found for: NITRU, WBCUA, BACTERIA, RBCUA, BLOODU, SPECGRAV, GLUCOSEU  A1C: No results for input(s): LABA1C in the last 72 hours. ABG:No results for input(s): PHART, LAO4EBR, PO2ART, LRE3WUI, BEART, HGBAE, V3BIAYUP, CARBOXHGBART in the last 72 hours. EKG:   Ordered today    IMAGING:  Chest x-ray PA and lateral ordered today      Assessment and Plan:    Principal Problem:    NSTEMI (non-ST elevated myocardial infarction) ([facility] [address]  Active Problems:    Elevated troponin I level    Essential hypertension  Admit patient to medical unit under full inpatient status  Continuous cardiac tele-monitoring  Patient likely given Aspirin in the ER on in the ambulance  Monitor cardiac enzymes ×3  Full 2-D echo with color-flow and doppler ordered in am to evaluate cardiac structure and function  Keep patient NPO after midnight  Cardiology consultation in am for evaluation, further treatment recommendations and work up        Dependence on nicotine from cigarettes          Tobacco abuse counseling  Patient smokes cigarettes on a chronic basis. Strictly advised patient to cut down on or quit smoking. Nicotine patch ordered. ~5 minutes spent on tobacco cessation counseling with the patient. Websites - http://smokefree. gov & [employer] [place] [place]. com    °Quitlines - 1-800-QUIT-NOW (9-274-192-693-889-8151)    °[employer] Apps - QuitSTART Manjit    °Text Messages - SmokefreeTXT (send the word QUIT to 18642)       Personal history of noncompliance with medical treatment and regimen  STRICTLY advised patient to be compliant with meds and medical recommendations  Advised patient to get established with a PCP upon discharge, take care of

## 2020-03-10 NOTE — PLAN OF CARE
Problem: Pain:  Goal: Pain level will decrease  Description: Pain level will decrease  3/10/2020 0849 by Autumn Paiz RN  Outcome: Ongoing  3/10/2020 0118 by Laura Sanford RN  Outcome: Ongoing  Goal: Control of acute pain  Description: Control of acute pain  3/10/2020 0849 by Autumn Paiz RN  Outcome: Ongoing  3/10/2020 0118 by Laura Sanford RN  Outcome: Ongoing  Goal: Control of chronic pain  Description: Control of chronic pain  3/10/2020 0849 by Autumn Paiz RN  Outcome: Ongoing  3/10/2020 0118 by Laura Sanford RN  Outcome: Ongoing     Problem: Falls - Risk of:  Goal: Will remain free from falls  Description: Will remain free from falls  3/10/2020 0849 by Autumn Paiz RN  Outcome: Ongoing  3/10/2020 0118 by Laura Sanford RN  Outcome: Ongoing  Goal: Absence of physical injury  Description: Absence of physical injury  3/10/2020 0849 by Autumn Paiz RN  Outcome: Ongoing  3/10/2020 0118 by Laura Sanford RN  Outcome: Ongoing     Problem: Discharge Planning:  Goal: Participates in care planning  Description: Participates in care planning  3/10/2020 0849 by Autumn Paiz RN  Outcome: Ongoing  3/10/2020 0118 by Laura Sanford RN  Outcome: Ongoing  Goal: Discharged to appropriate level of care  Description: Discharged to appropriate level of care  3/10/2020 0849 by Autumn Paiz RN  Outcome: Ongoing  3/10/2020 0118 by Laura Sanford RN  Outcome: Ongoing     Problem:  Activity Intolerance:  Goal: Ability to tolerate increased activity will improve  Description: Ability to tolerate increased activity will improve  3/10/2020 0849 by Autumn Paiz RN  Outcome: Ongoing  3/10/2020 0118 by Laura Sanford RN  Outcome: Ongoing     Problem: Pain:  Goal: Ability to notify healthcare provider of pain before it becomes unmanageable or unbearable will improve  Description: Ability to notify healthcare provider of pain before it becomes unmanageable or unbearable will improve  3/10/2020 0849 by Autumn Paiz RN  Outcome: Ongoing  3/10/2020 0118 by Joshua Kaminski RN  Outcome: Ongoing  Goal: Control of acute pain  Description: Control of acute pain  3/10/2020 0849 by Roz Vazquez RN  Outcome: Ongoing  3/10/2020 0118 by Joshua Kaminski RN  Outcome: Ongoing  Goal: Control of chronic pain  Description: Control of chronic pain  3/10/2020 0849 by Roz Vazquez RN  Outcome: Ongoing  3/10/2020 0118 by Joshua Kaminski RN  Outcome: Ongoing     Problem: Cardiac:  Goal: Ability to maintain an adequate cardiac output will improve  Description: Ability to maintain an adequate cardiac output will improve  3/10/2020 0849 by Roz Vazquez RN  Outcome: Ongoing  3/10/2020 0118 by Joshua Kaminski RN  Outcome: Ongoing  Goal: Complications related to the disease process, condition or treatment will be avoided or minimized  Description: Complications related to the disease process, condition or treatment will be avoided or minimized  3/10/2020 0849 by Roz Vazquez RN  Outcome: Ongoing  3/10/2020 0118 by Joshua Kaminski RN  Outcome: Ongoing  Goal: Hemodynamic stability will improve  Description: Hemodynamic stability will improve  3/10/2020 0849 by Roz Vazquez RN  Outcome: Ongoing  3/10/2020 0118 by Joshua Kaminski RN  Outcome: Ongoing  Goal: Risk factors for ineffective tissue perfusion will decrease  Description: Risk factors for ineffective tissue perfusion will decrease  3/10/2020 0849 by Roz Vazquez RN  Outcome: Ongoing  3/10/2020 0118 by Joshua Kaminski RN  Outcome: Ongoing

## 2020-03-10 NOTE — CONSULTS
[facility]  Cardiology Consult      Requesting MD:  Mina Mullins MD   Admit Status:  Inpatient [101]       History obtained from:   ? Patient  ? Other (specify):     PROBLEM LIST:    Patient Active Problem List    Diagnosis Date Noted    NSTEMI (non-ST elevated myocardial infarction) ([facility] [address] 03/09/2020     Priority: Low    Cigarette nicotine dependence with nicotine-induced disorder 03/09/2020     Priority: Low    Tobacco abuse counseling 03/09/2020     Priority: Low    Personal history of noncompliance with medical treatment and regimen 03/09/2020     Priority: Low    Elevated troponin I level 03/09/2020     Priority: Low    Essential hypertension 05/16/2017     Priority: Low     1. Hypertension with poor control. 2. Right mid back pain. 3. History of noncompliance. 4. Chronic ongoing tobacco use. PRESENTATION: Lorraine Boss is a 50y.o. year old male who presents as a transfer from [facility] with possible positive troponin of 0.17. The patient has been having right mid back pain since Saturday. Pain has been constant and aggravated by movement and sitting up. At work on Monday his blood pressure was elevated. He does not take his medications and had run out. He had not been feeling well and they had checked his blood pressure at 195/119mmHg. At home his blood pressure was still elevated and he went to [facility] where he was evaluated and noted to have borderline positive troponin and referred here for further evaluation. He reports no chest pain, shortness of breath, palpitations or diaphoresis. He is currently in pain from his back. He smokes about 2-1/2 packs per day. No reported family history of premature CAD. REVIEW OF SYSTEMS:  Review of Systems   Constitutional: Negative for activity change, diaphoresis and fatigue. HENT: Negative for hearing loss, nosebleeds and tinnitus. Eyes: Negative for visual disturbance.    Respiratory: Negative for cough, shortness of breath and wheezing. Cardiovascular: Negative for chest pain, palpitations and leg swelling. Gastrointestinal: Negative for abdominal distention, abdominal pain, blood in stool, diarrhea and vomiting. Endocrine: Negative for cold intolerance, heat intolerance, polydipsia, polyphagia and polyuria. Genitourinary: Negative for difficulty urinating, flank pain and hematuria. Musculoskeletal: Positive for back pain. Negative for arthralgias, joint swelling and myalgias. Skin: Negative for pallor and rash. Neurological: Negative for dizziness, seizures, syncope and headaches. Psychiatric/Behavioral: Negative for behavioral problems and dysphoric mood. The patient is not nervous/anxious.         Past Medical History:      Diagnosis Date    Chest pain     Chronic back pain     ED (erectile dysfunction)     GERD (gastroesophageal reflux disease)     Hypertension     IBS (irritable bowel syndrome)     Tobacco abuse        Past Surgical History:      Procedure Laterality Date    TONSILLECTOMY AND ADENOIDECTOMY         Allergies:  Codeine    Past Social History:  Social History     Socioeconomic History    Marital status:      Spouse name: Not on file    Number of children: Not on file    Years of education: Not on file    Highest education level: Not on file   Occupational History    Not on file   Social Needs    Financial resource strain: Not on file    Food insecurity     Worry: Not on file     Inability: Not on file    Transportation needs     Medical: Not on file     Non-medical: Not on file   Tobacco Use    Smoking status: Current Every Day Smoker     Packs/day: 2.00    Smokeless tobacco: Former User   Substance and Sexual Activity    Alcohol use: Yes     Comment: ocassionaly    Drug use: No    Sexual activity: Not on file   Lifestyle    Physical activity     Days per week: Not on file     Minutes per session: Not on file    Stress: Not on file Relationships    Social connections     Talks on phone: Not on file     Gets together: Not on file     Attends Denominational service: Not on file     Active member of club or organization: Not on file     Attends meetings of clubs or organizations: Not on file     Relationship status: Not on file    Intimate partner violence     Fear of current or ex partner: Not on file     Emotionally abused: Not on file     Physically abused: Not on file     Forced sexual activity: Not on file   Other Topics Concern    Not on file   Social History Narrative    Not on file       Family History:       Problem Relation Age of Onset    Cancer Father     High Blood Pressure Sister     Other Sister         Headaches     Other Mother         Crohn's       Home Meds:  Prior to Admission medications    Medication Sig Start Date End Date Taking?  Authorizing Provider   lisinopril-hydrochlorothiazide (PRINZIDE;ZESTORETIC) 20-12.5 MG per tablet Take 1 tablet by mouth daily 3/13/19  Yes IRMA Merino   lisinopril-hydrochlorothiazide [facility]) 20-25 MG per tablet Take 1 tablet by mouth daily 1/30/18   IRMA Merino   ondansetron (ZOFRAN ODT) 4 MG disintegrating tablet Take 1 tablet by mouth every 8 hours as needed for Nausea or Vomiting 1/30/18   IRMA Merino   guaiFENesin [facility]) 600 MG extended release tablet Take 2 tablets by mouth 2 times daily 11/22/17   IRMA Merino   sildenafil (VIAGRA) 100 MG tablet Take 1 tablet by mouth as needed for Erectile Dysfunction 5/16/17   IRMA Merino       Current Meds:   amLODIPine  5 mg Oral BID    guaiFENesin  1,200 mg Oral BID    sodium chloride flush  10 mL Intravenous 2 times per day    famotidine  20 mg Oral BID    atorvastatin  40 mg Oral Nightly    aspirin  81 mg Oral Daily    nitroglycerin  0.5 inch Topical 4 times per day    nicotine  1 patch Transdermal Daily    lisinopril  20 mg Oral Daily    And    hydroCHLOROthiazide  25 mg Oral Daily       Current Infused Meds:   heparin (porcine) 12 Units/kg/hr (03/09/20 2324)       Physical Exam:  Vitals:    03/10/20 0643   BP: (!) 172/89   Pulse: 52   Resp: 24   Temp: 96.9 °F (36.1 °C)   SpO2: 94%     No intake or output data in the 24 hours ending 03/10/20 0807  Estimated body mass index is 28 kg/m² as calculated from the following:    Height as of this encounter: 6' 1\" (1.854 m). Weight as of this encounter: 212 lb 3.2 oz (96.3 kg). Physical Exam  Constitutional:       Appearance: He is well-developed. Comments: Appears in distress from his right mid back pain. HENT:      Mouth/Throat:      Pharynx: No oropharyngeal exudate. Eyes:      General: No scleral icterus. Right eye: No discharge. Left eye: No discharge. Neck:      Thyroid: No thyromegaly. Vascular: No JVD. Cardiovascular:      Rate and Rhythm: Normal rate and regular rhythm. Heart sounds: No murmur. No friction rub. No gallop. Pulmonary:      Effort: No respiratory distress. Breath sounds: No stridor. No wheezing or rales. Abdominal:      General: Bowel sounds are normal. There is no distension. Palpations: Abdomen is soft. There is no mass. Tenderness: There is no abdominal tenderness. There is no guarding or rebound. Musculoskeletal:         General: No deformity. Comments: Unable to elicit the pain with local tenderness  He feels it's in his spine. He does localize it to the left right lower rib border with no clear percussion tenderness. Appears more with sitting. Skin:     General: Skin is warm. Coloration: Skin is not pale. Findings: No erythema or rash. Neurological:      Mental Status: He is alert and oriented to person, place, and time. Motor: No abnormal muscle tone.       Coordination: Coordination normal.      Deep Tendon Reflexes: Reflexes normal.           Labs:  Recent Labs

## 2020-03-11 VITALS
DIASTOLIC BLOOD PRESSURE: 97 MMHG | TEMPERATURE: 97.6 F | RESPIRATION RATE: 16 BRPM | HEIGHT: 73 IN | HEART RATE: 64 BPM | OXYGEN SATURATION: 97 % | BODY MASS INDEX: 27.54 KG/M2 | WEIGHT: 207.8 LBS | SYSTOLIC BLOOD PRESSURE: 160 MMHG

## 2020-03-11 LAB
ANION GAP SERPL CALCULATED.3IONS-SCNC: 12 MMOL/L (ref 7–19)
APTT: 25.9 SEC (ref 26–36.2)
BUN BLDV-MCNC: 13 MG/DL (ref 6–20)
CALCIUM SERPL-MCNC: 9.8 MG/DL (ref 8.6–10)
CHLORIDE BLD-SCNC: 99 MMOL/L (ref 98–111)
CO2: 27 MMOL/L (ref 22–29)
CREAT SERPL-MCNC: 0.9 MG/DL (ref 0.5–1.2)
GFR NON-AFRICAN AMERICAN: >60
GLUCOSE BLD-MCNC: 104 MG/DL (ref 74–109)
HCT VFR BLD CALC: 47.7 % (ref 42–52)
HEMOGLOBIN: 15.7 G/DL (ref 14–18)
LV EF: 58 %
LV EF: 58 %
LVEF MODALITY: NORMAL
LVEF MODALITY: NORMAL
MCH RBC QN AUTO: 31.6 PG (ref 27–31)
MCHC RBC AUTO-ENTMCNC: 32.9 G/DL (ref 33–37)
MCV RBC AUTO: 96 FL (ref 80–94)
PDW BLD-RTO: 12.8 % (ref 11.5–14.5)
PLATELET # BLD: 187 K/UL (ref 130–400)
PMV BLD AUTO: 9.6 FL (ref 9.4–12.4)
POTASSIUM SERPL-SCNC: 4.4 MMOL/L (ref 3.5–5)
RBC # BLD: 4.97 M/UL (ref 4.7–6.1)
SODIUM BLD-SCNC: 138 MMOL/L (ref 136–145)
WBC # BLD: 6.9 K/UL (ref 4.8–10.8)

## 2020-03-11 PROCEDURE — 80048 BASIC METABOLIC PNL TOTAL CA: CPT

## 2020-03-11 PROCEDURE — 85730 THROMBOPLASTIN TIME PARTIAL: CPT

## 2020-03-11 PROCEDURE — 85027 COMPLETE CBC AUTOMATED: CPT

## 2020-03-11 PROCEDURE — 6370000000 HC RX 637 (ALT 250 FOR IP): Performed by: INTERNAL MEDICINE

## 2020-03-11 PROCEDURE — 2580000003 HC RX 258: Performed by: HOSPITALIST

## 2020-03-11 PROCEDURE — 36415 COLL VENOUS BLD VENIPUNCTURE: CPT

## 2020-03-11 PROCEDURE — 90686 IIV4 VACC NO PRSV 0.5 ML IM: CPT | Performed by: HOSPITALIST

## 2020-03-11 PROCEDURE — 99222 1ST HOSP IP/OBS MODERATE 55: CPT | Performed by: NEUROLOGICAL SURGERY

## 2020-03-11 PROCEDURE — G0008 ADMIN INFLUENZA VIRUS VAC: HCPCS | Performed by: HOSPITALIST

## 2020-03-11 PROCEDURE — 93306 TTE W/DOPPLER COMPLETE: CPT

## 2020-03-11 PROCEDURE — 99232 SBSQ HOSP IP/OBS MODERATE 35: CPT | Performed by: INTERNAL MEDICINE

## 2020-03-11 PROCEDURE — 6360000002 HC RX W HCPCS: Performed by: HOSPITALIST

## 2020-03-11 PROCEDURE — 6370000000 HC RX 637 (ALT 250 FOR IP): Performed by: HOSPITALIST

## 2020-03-11 RX ORDER — ASPIRIN 81 MG/1
81 TABLET, CHEWABLE ORAL DAILY
Qty: 30 TABLET | Refills: 0 | Status: SHIPPED | OUTPATIENT
Start: 2020-03-12 | End: 2021-06-23 | Stop reason: SDUPTHER

## 2020-03-11 RX ORDER — GUAIFENESIN 600 MG/1
1200 TABLET, EXTENDED RELEASE ORAL 2 TIMES DAILY
Qty: 40 TABLET | Refills: 0 | Status: SHIPPED | OUTPATIENT
Start: 2020-03-11 | End: 2020-03-18

## 2020-03-11 RX ORDER — AMLODIPINE BESYLATE 5 MG/1
5 TABLET ORAL 2 TIMES DAILY
Qty: 30 TABLET | Refills: 0 | Status: SHIPPED | OUTPATIENT
Start: 2020-03-11 | End: 2020-03-26 | Stop reason: SDUPTHER

## 2020-03-11 RX ORDER — CARVEDILOL 6.25 MG/1
6.25 TABLET ORAL 2 TIMES DAILY WITH MEALS
Status: DISCONTINUED | OUTPATIENT
Start: 2020-03-11 | End: 2020-03-11 | Stop reason: HOSPADM

## 2020-03-11 RX ORDER — ATORVASTATIN CALCIUM 40 MG/1
40 TABLET, FILM COATED ORAL NIGHTLY
Qty: 30 TABLET | Refills: 0 | Status: SHIPPED | OUTPATIENT
Start: 2020-03-11 | End: 2020-03-26 | Stop reason: SDUPTHER

## 2020-03-11 RX ORDER — CARVEDILOL 6.25 MG/1
6.25 TABLET ORAL 2 TIMES DAILY WITH MEALS
Qty: 60 TABLET | Refills: 0 | Status: SHIPPED | OUTPATIENT
Start: 2020-03-11 | End: 2020-03-26 | Stop reason: SDUPTHER

## 2020-03-11 RX ADMIN — INFLUENZA VIRUS VACCINE 0.5 ML: 15; 15; 15; 15 SUSPENSION INTRAMUSCULAR at 08:13

## 2020-03-11 RX ADMIN — OXYCODONE HYDROCHLORIDE AND ACETAMINOPHEN 1 TABLET: 5; 325 TABLET ORAL at 01:59

## 2020-03-11 RX ADMIN — OXYCODONE HYDROCHLORIDE AND ACETAMINOPHEN 1 TABLET: 5; 325 TABLET ORAL at 08:19

## 2020-03-11 RX ADMIN — LISINOPRIL 20 MG: 20 TABLET ORAL at 08:12

## 2020-03-11 RX ADMIN — ASPIRIN 81 MG 81 MG: 81 TABLET ORAL at 08:12

## 2020-03-11 RX ADMIN — AMLODIPINE BESYLATE 5 MG: 5 TABLET ORAL at 08:12

## 2020-03-11 RX ADMIN — HYDROCHLOROTHIAZIDE 25 MG: 50 TABLET ORAL at 08:12

## 2020-03-11 RX ADMIN — CARVEDILOL 6.25 MG: 6.25 TABLET, FILM COATED ORAL at 08:12

## 2020-03-11 RX ADMIN — FAMOTIDINE 20 MG: 20 TABLET ORAL at 08:11

## 2020-03-11 RX ADMIN — SODIUM CHLORIDE, PRESERVATIVE FREE 10 ML: 5 INJECTION INTRAVENOUS at 08:15

## 2020-03-11 ASSESSMENT — PAIN SCALES - GENERAL
PAINLEVEL_OUTOF10: 2
PAINLEVEL_OUTOF10: 3
PAINLEVEL_OUTOF10: 3
PAINLEVEL_OUTOF10: 0
PAINLEVEL_OUTOF10: 1
PAINLEVEL_OUTOF10: 3
PAINLEVEL_OUTOF10: 1

## 2020-03-11 ASSESSMENT — PAIN DESCRIPTION - PAIN TYPE
TYPE: ACUTE PAIN

## 2020-03-11 ASSESSMENT — PAIN DESCRIPTION - LOCATION
LOCATION: BACK

## 2020-03-11 ASSESSMENT — PAIN DESCRIPTION - ORIENTATION
ORIENTATION: RIGHT;LOWER
ORIENTATION: MID
ORIENTATION: RIGHT;LOWER

## 2020-03-11 ASSESSMENT — ENCOUNTER SYMPTOMS
RESPIRATORY NEGATIVE: 1
GASTROINTESTINAL NEGATIVE: 1
EYES NEGATIVE: 1
BACK PAIN: 1
ALLERGIC/IMMUNOLOGIC NEGATIVE: 1

## 2020-03-11 NOTE — PLAN OF CARE
Problem: Pain:  Goal: Pain level will decrease  Description: Pain level will decrease  Outcome: Ongoing     Problem: Pain:  Goal: Ability to notify healthcare provider of pain before it becomes unmanageable or unbearable will improve  Description: Ability to notify healthcare provider of pain before it becomes unmanageable or unbearable will improve  Outcome: Ongoing     Problem: Cardiac:  Goal: Ability to maintain an adequate cardiac output will improve  Description: Ability to maintain an adequate cardiac output will improve  Outcome: Ongoing

## 2020-03-11 NOTE — DISCHARGE SUMMARY
Andrew Congress  :  1965  MRN:  [id number]    Admit date:  3/9/2020  Discharge date:   3/11/2020    Discharging Physician:  Gavin Gomez MD    Advance Directive: Full Code    Consults: Cardiology. Neurosurgery     Primary Care Physician:  IRMA Mac    Discharge Diagnoses:  Principal Problem:    NSTEMI (non-ST elevated myocardial infarction) [facility])  Active Problems:    Essential hypertension    Cigarette nicotine dependence with nicotine-induced disorder    Tobacco abuse counseling    Personal history of noncompliance with medical treatment and regimen    Elevated troponin I level    DDD (degenerative disc disease), lumbosacral  Resolved Problems:    * No resolved hospital problems. *      Portions of this note have been copied forward, however, changed to reflect the most current clinical status of this patient. Hospital Course:    3/9/2020 Rupal GrahamGabby Onofre is a 61-year-old male with past medical history of tobacco abuse, irritable bowel syndrome, hypertension, GERD, chronic back pain, and erectile dysfunction. [facility] where he presented with complaints of chest pain. Troponin at the outlying facility was 0.17. Patient was transferred to our facility for higher level of care and cardiology evaluation. He was started on low-dose heparin as per protocol, cardiac monitoring and cardiac evaluation in the morning. 3/10/2020 Cardiology evaluated patient and noted elevated blood pressure recommended echocardiogram as well as Lexiscan to rule out ischemia. Cardiology added Norvasc 5 mg twice a day and holding off on beta-blocker since he is bradycardic. Patient is a heavy tobacco abuser. CTA of the aorta to rule out dissection was also ordered. Sandra scan negative with normal LV EF. Patient complaining of low back pain MRI was ordered showing degenerative disc disease and neurosurgical consult was placed.    3/11/2020 This is too small to be further characterized. The remaining liver and spleen are unremarkable. A normal gallbladder seen. No gallstones. Common bile duct is nondilated. The pancreas appear normal. The distal pancreatic duct is visualized and appears normal. Small nodule is seen in the medial limb of the left adrenal gland measuring 1.4 cm. The right adrenal gland is normal. The renal outline bilaterally is normal. No evidence of a mass. A 3 mm calculus is seen in the upper pole of the left kidney. No calculi in the right kidney. No hydronephrosis on either side. The urinary bladder is incompletely distended. No intrinsic abnormality. The prostate is moderately enlarged with intrinsic calcification. There is a fat-containing left inguinal hernia. There is a tiny fat-containing umbilical hernia. The stomach and duodenum appear normal. Small bowel is normal and nondistended. Normal appendix is noted. Moderate gas and stool are seen in the colon. There are no finding to suggest obstruction. The mild atheromatous changes of the abdominal aorta and iliac arteries are seen. No aneurysmal dilatation. No evidence of dissection. There is a mild acute angulation of the celiac axis with a mild, less than 50% narrowing and a mild poststenotic dilatation. This is probably due to a prominent left medial articular cartilage ligament. There is normal origin course and caliber of the superior mesenteric artery. There is normal origin course and caliber of dual renal arteries bilaterally. There is normal origin course and caliber of the inferior mesenteric artery. Normal common, internal and external iliac arteries bilaterally is seen. No aneurysmal dilatation or focal stenosis. Both common femoral arteries divide into normal-appearing superficial or deep femoral arteries. Due to arterial phase image acquisition, the systemic and portal venous system is not opacified and not evaluated.  There is no evidence of abdominal or pelvic tablet by mouth as needed for Erectile Dysfunction                 Discharge Instructions: Follow up with IRMA Nails on 3/18/2020 at 12:30pm and IRMA Lugo on 4/8/2020 at 9:45am.  Take medications as directed. Resume activity as tolerated. Diet: DIET CARDIAC; No Caffeine     Disposition: Patient is medically stable and will be discharged home. Time spent on discharge 45 minutes spent in assessing patient, reviewing medications, discussion with nursing, confirming safe discharge plan and preparation of discharge summary.     Signed:  Tatyana Cotter PA-C   3/11/2020  11:44 AM

## 2020-03-11 NOTE — PROGRESS NOTES
[facility], [place] [address]      DEPARTMENT OF HOSPITALIST MEDICINE        PROGRESS  NOTE:    NOTE: Portions of this note have been copied forward, however, changed to reflect the most current clinical status of this patient. Patient:  Abbie Branch  YOB: 1965  Date of Service: 3/10/2020  MRN: [id number]   Acct: [de-identified]   Primary Care Physician: IRMA Gregorio  Advance Directive: Full Code  Admit Date: 3/9/2020       Hospital Day: 1      CHIEF COMPLAINT:  Patient transferred from [facility] ER with atypical chest pain and borderline elevated troponin. SUBJECTIVE / [address]  COURSE:    3/10/2020:  Patient feels better. Chest pain symptoms have improved. Patient underwent Lexiscan stress test which was normal.  He complains of worsening low back pain for which I ordered an MRI of the brain which showed degenerative disc disease lumbosacral spine with multiple abnormalities, for which I am going to order a neurosurgical consult. 3/9/2020:  HISTORY OF PRESENT ILLNESS:  Abbie Branch is an 50 y.o. male. He is a very pleasant gentleman who works as a  on a [address], and is required to lift heavy windows at times. He is also a chronic smoker who smokes around 2-1/2  packs of cigarettes on a daily basis. He came to [facility] ER with complaints of atypical chest pain located in the right anterior chest intensity 4-5 out of 10 without any radiation associated with shortness of breath and sweating but no palpitations. Initial troponin I was done in the surgery ER which was 0.17. Patient was transferred to our facility for higher level of care and cardiology evaluation. I immediately examined the patient at the bedside upon arrival and he was almost completely chest pain-free. I immediately ordered a stat troponin in our facility which was 0.01.   He was started on low-dose heparin as per protocol and is being admitted for medical management, cardiac monitoring, cardiology evaluation and work-up in the morning.           REVIEW  OF  SYSTEMS:    Constitutional:  No fevers, chills, nausea, vomiting,    Lungs:   No hemoptysis, pleurisy   Heart:  No chest pressure with exertion, no palpitations,    Abdomen:   No new masses, no bright red blood per rectum   Extremities: + acute pain while ambulating, no new lesions   Neurologic: No new motor or sensory changes, + chronic low back pain       PAST MEDICAL HISTORY:  Past Medical History:   Diagnosis Date    Chest pain     Chronic back pain     ED (erectile dysfunction)     GERD (gastroesophageal reflux disease)     Hypertension     IBS (irritable bowel syndrome)     Tobacco abuse          PAST SURGICAL HISTORY:  Past Surgical History:   Procedure Laterality Date    TONSILLECTOMY AND ADENOIDECTOMY          FAMILY HISTORY:  Family History   Problem Relation Age of Onset    Cancer Father     High Blood Pressure Sister     Other Sister         Headaches     Other Mother         Crohn's           OBJECTIVE:  BP (!) 164/104   Pulse 71   Temp 97.3 °F (36.3 °C) (Temporal)   Resp 16   Ht 6' 1\" (1.854 m)   Wt 212 lb 3.2 oz (96.3 kg)   SpO2 96%   BMI 28.00 kg/m²   No intake/output data recorded.     PHYSICAL  EXAMINATION:    DWIGHT:  Awake, alert, oriented x 3, patient appears tired and fatigued   Head/Eyes:  Normocephalic, atraumatic, EOMI and PERRLA bilaterally   Respiratory:   Bilateral decreased air entry in both lung fields, mild B/L crackles, symmetric expansion of chest   Cardiovascular:  Regular rate and rhythm, S1+S2+0, no murmurs/rubs   Abdomen:   Soft, non-tender, bowel sounds +ve, no organomegaly   Extremities: Moves all, decreased range of motion, no edema, + b/l straight leg raising tests   Neurologic: Awake, alert, oriented x 3, cranial nerves II-XII intact, no focal neurological deficits, sensory system intact   Psychiatric: Normal mood, non-suicidal CURRENT MEDICATIONS:  Scheduled:   amLODIPine  5 mg Oral BID    [START ON 3/11/2020] influenza virus vaccine  0.5 mL Intramuscular Once    guaiFENesin  1,200 mg Oral BID    sodium chloride flush  10 mL Intravenous 2 times per day    famotidine  20 mg Oral BID    atorvastatin  40 mg Oral Nightly    aspirin  81 mg Oral Daily    nicotine  1 patch Transdermal Daily    lisinopril  20 mg Oral Daily    And    hydroCHLOROthiazide  25 mg Oral Daily        PRN:  oxyCODONE-acetaminophen, 1 tablet, Q4H PRN  regadenoson, 0.4 mg, ONCE PRN  sodium chloride flush, 10 mL, PRN  potassium chloride, 40 mEq, PRN    Or  potassium alternative oral replacement, 40 mEq, PRN    Or  potassium chloride, 10 mEq, PRN  magnesium sulfate, 1 g, PRN  acetaminophen, 650 mg, Q6H PRN    Or  acetaminophen, 650 mg, Q6H PRN  polyethylene glycol, 17 g, Daily PRN  promethazine, 12.5 mg, Q6H PRN    Or  ondansetron, 4 mg, Q6H PRN        Infusions:      Laboratory Data:  Recent Labs     03/09/20 2032 03/09/20  2359   WBC 9.5 8.4   HGB 14.5 13.8*    176     Recent Labs     03/10/20  0233      K 4.0      CO2 23   BUN 11   CREATININE 0.8   GLUCOSE 109     No results for input(s): AST, ALT, ALB, BILITOT, ALKPHOS in the last 72 hours. Troponin T:   Recent Labs     03/09/20  1857 03/09/20  2359 03/10/20  0233   TROPONINI <0.01 <0.01 <0.01     Pro-BNP: No results for input(s): BNP in the last 72 hours. INR: No results for input(s): INR in the last 72 hours. UA:No results for input(s): NITRITE, COLORU, PHUR, LABCAST, WBCUA, RBCUA, MUCUS, TRICHOMONAS, YEAST, BACTERIA, CLARITYU, SPECGRAV, LEUKOCYTESUR, UROBILINOGEN, BILIRUBINUR, BLOODU, GLUCOSEU, AMORPHOUS in the last 72 hours. Invalid input(s): Zehra Carota  A1C: No results for input(s): LABA1C in the last 72 hours. ABG:No results for input(s): PHART, UQN4JKT, PO2ART, DER4SME, BEART, HGBAE, N6UXNCOP, CARBOXHGBART in the last 72 hours.       Imaging:    Xr Chest Standard (2 Vw)    Result Date: 3/9/2020  Impression: 1. No acute cardiopulmonary process. Signed by Dr Bruna Fernandes on 3/9/2020 9:31 PM    Cta Chest W Wo Contrast    Result Date: 3/10/2020  No evidence of aortic aneurysm or dissection. Signed by Dr Raul Whitney on 3/10/2020 2:05 PM    Mri Lumbar Spine Wo Contrast    Result Date: 3/10/2020  1. Grade 1 lytic spondylolisthesis at L5-S1. No significant spinal stenosis at this level. Severe left-sided and moderate right-sided neuroforaminal narrowing as result of the lytic spondylolisthesis. 2. Loss of disc height with endplate spurring at M6-J0 and L2-L3. No significant spinal stenosis at these levels. 3. Facet arthropathy causing moderate bilateral neuroforaminal narrowing at L3-L4. Signed by Dr Sarah Subramanian on 3/10/2020 4:56 PM    Cta Abdomen W Contrast    Result Date: 3/10/2020  No evidence of aortic aneurysm or dissection. The details are given above. A 3 mm nonobstructing left renal calculus. Left adrenal nodule. Signed by Dr Raul Whitney on 3/10/2020 1:56 PM    Nm Myocardial Spect Rest Exercise Or Rx    Result Date: 3/10/2020  Impression: There is no obvious infarct or ischemia, with a calculated ejection fraction of 58 %.  Suggest: Medical management Signed by Dr Laura Mcdaniels on 3/10/2020 5:38 PM       ASSESSMENT & PLAN:    Principal Problem:    NSTEMI (non-ST elevated myocardial infarction) ([address]  Active Problems:    Elevated troponin I level    Essential hypertension  Admit patient to medical unit under full inpatient status  Continuous cardiac tele-monitoring  Monitor cardiac enzymes ×3 which were normal in our facility  Patient underwent Lexiscan which was negative for any cardiac or ischemia  Follow-up closely with cardiology for treatment recommendations     DDD (degenerative disc disease), lumbosacral  Patient has chronic low back pain for a long time  MRI of the brain was done which showed multilevel degenerative changes in the lumbosacral spine  Keep patient

## 2020-03-11 NOTE — CONSULTS
[facility] Neurosurgery Consultation        REASON FOR CONSULTATION:  Lumbar spondylolisthesis      HISTORY OF PRESENT ILLNESS:      The patient is a 50 y.o. male who presented to the ED in [place] complaining of atypical chest pain. He was found to have an elevated troponin and he was emergently transferred to [facility] for caradiac evaluation. Subsequent troponin tests since arrival have been negative and he has undergone a thorough cardiac workup that has been unrevealing. He complains mostly of pain at the thoracolumbar junction with some radiation to the right side. He denies any radiation of pain into his legs. He denies any numbness, tingling or weakness in his legs. He denies any difficulty walking or loss of bowel or bladder control. He underwent CTA imaging of his chest, abdomen and pelvis to exclude aortic dissection and this revealed an incidental lytic spondylolistehsis at L5/S1 that has been further evaluated with a lumbar MRI. I have been asked to provide neurosurgical consultation regarding this. Of note, he is a heavy smoker, ~2 packs of cigarettes per day.       MEDICAL HISTORY:             Past Medical History:   Diagnosis Date    Chest pain     Chronic back pain     ED (erectile dysfunction)     GERD (gastroesophageal reflux disease)     Hypertension     IBS (irritable bowel syndrome)     Tobacco abuse        Past Surgical History:   Procedure Laterality Date    TONSILLECTOMY AND ADENOIDECTOMY           Current Facility-Administered Medications:     carvedilol (COREG) tablet 6.25 mg, 6.25 mg, Oral, BID WC, Kenia Duque MD, 6.25 mg at 03/11/20 0812    amLODIPine (NORVASC) tablet 5 mg, 5 mg, Oral, BID, Kenia Duque MD, 5 mg at 03/11/20 0812    oxyCODONE-acetaminophen (PERCOCET) 5-325 MG per tablet 1 tablet, 1 tablet, Oral, Q4H PRN, Kenia Duque MD, 1 tablet at 03/11/20 0819    regadenoson (LEXISCAN) injection 0.4 mg, 0.4 mg, Intravenous, ONCE PRN, Kenia Duque MD    guaiFENesin [facility]) Activity   Alcohol Use Yes    Comment: ocassionaly         Family History:   Family History   Problem Relation Age of Onset    Cancer Father     High Blood Pressure Sister     Other Sister         Headaches     Other Mother         Crohn's       REVIEW OF SYSTEMS:  Review of Systems   Constitutional: Negative. HENT: Negative. Eyes: Negative. Respiratory: Negative. Cardiovascular: Positive for chest pain. Gastrointestinal: Negative. Endocrine: Negative. Genitourinary: Negative. Musculoskeletal: Positive for back pain. Skin: Negative. Allergic/Immunologic: Negative. Neurological: Negative. Hematological: Negative. Psychiatric/Behavioral: Negative. PHYSICAL EXAM:    Vitals:    03/11/20 0701   BP: (!) 162/100   Pulse: 65   Resp: 18   Temp: 97.6 °F (36.4 °C)   SpO2: 97%       Constitutional: Appears well-developed and well-nourished. Eyes - conjunctiva normal.  Pupils react to light  Ear, nose,throat -Normal pinna and tragus, No scars, or lesions over external nose or ears, no obvious atrophy of tongue  Neck- symmetric, trachea midline, no jugular vein distension, no thyromegaly  Respiration- chest wall symmetric, normal effort without use of accessory muscles  Musculoskeletal - no significant wasting of muscles noted, no bony deformities, symmetric bulk  Extremities- no clubbing, cyanosis or edema  Skin - warm, dry, and intact. No rash,erythema, or pallor.   Psychiatric - mood, affect, and behavior appear normal.       NEUROLOGIC EXAM:    MENTAL STATUS: Alert, oriented, thought content appropriate    CRANIAL NERVES: PERRL, EOMI, symmetric facies, tongue midline      MOTOR:     Right Upper Extremity:    Deltoid: 5/5  Biceps: 5/5  Triceps: 5/5  : 5/5    Left Upper Extremity:    Deltoid: 5/5  Biceps: 5/5  Triceps: 5/5  : 5/5    Right Lower Extremity:    Hip Flexion: 5/5  Knee Extension: 5/5  Ankle Plantarflexion: 5/5  Ankle Dorsiflexion: 5/5      Left Lower Extremity:    Hip Flexion: 5/5  Knee Extension: 5/5  Ankle Plantarflexion: 5/5  Ankle Dorsiflexion: 5/5      SOMATOSENSORY:     Right Upper Extremity: normal light touch sensation  Left Upper Extremity: normal light touch sensation  Right Lower Extremity: normal light touch sensation  Left Lower Extremity: normal light touch sensation      REFLEXES:    Biceps: 2+  Patella: 2+      Easton's: Negative  Clonus: Negative    COORDINATION and GAIT:    GAIT: Normal      DATA:    Weight: 207 lb 12.8 oz (94.3 kg), BP: (!) 162/100      Lab Results   Component Value Date    WBC 6.9 03/11/2020    HGB 15.7 03/11/2020    HCT 47.7 03/11/2020    MCV 96.0 (H) 03/11/2020     03/11/2020     Lab Results   Component Value Date     03/11/2020    K 4.4 03/11/2020    CL 99 03/11/2020    CO2 27 03/11/2020    BUN 13 03/11/2020    CREATININE 0.9 03/11/2020    GLUCOSE 104 03/11/2020    CALCIUM 9.8 03/11/2020    LABGLOM >60 03/11/2020   No results found for: INR, PROTIME    Cta Chest W Wo Contrast    Result Date: 3/10/2020  No evidence of aortic aneurysm or dissection. Signed by Dr Valentin Mccauley on 3/10/2020 2:05 PM    Mri Lumbar Spine Wo Contrast    Result Date: 3/10/2020  1. Grade 1 lytic spondylolisthesis at L5-S1. No significant spinal stenosis at this level. Severe left-sided and moderate right-sided neuroforaminal narrowing as result of the lytic spondylolisthesis. 2. Loss of disc height with endplate spurring at W8-A8 and L2-L3. No significant spinal stenosis at these levels. 3. Facet arthropathy causing moderate bilateral neuroforaminal narrowing at L3-L4. Signed by Dr Mark Rodríguez on 3/10/2020 4:56 PM    Cta Abdomen W Contrast    Result Date: 3/10/2020  No evidence of aortic aneurysm or dissection. The details are given above. A 3 mm nonobstructing left renal calculus. Left adrenal nodule.  Signed by Dr Valentin Mccauley on 3/10/2020 1:56 PM    Nm Myocardial Spect Rest Exercise Or Rx    Result Date: 3/10/2020  Impression: There is no obvious infarct or ischemia, with a calculated ejection fraction of 58 %. Suggest: Medical management Signed by Dr Ofe Stephens on 3/10/2020 5:38 PM        IMAGING:    My interpretation of imaging studies:  I with the radiologist.  Chronic degenerative changes in the lumbar spine with lytic L5/S1 spondylolisthesis. There is no significant central canal stenosis but there is significant foraminal stenosis that is worst on the left. ASSESSMENT AND PLAN:  This is a 50 y.o. male who presented as a transfer to [facility] for evaluation of chest pain and an elevated troponin. On arrival, he was noted to have right-sided back pain at the thoracolumbar junction with some radiation to the right. Subsequent imaging revealed a spondylolisthesis at L5/S1. This is a chronic finding and he does not describe any pain that can be attributed to this. He has no radicular pain and his strength and sensation are normal.  I explained the imaging findings to him and advised him that in the absence of weakness or incapacitating lower back pain and with a normal neurologic exam, there was no indication for any neurosurgical intervention. He may follow up with me on an outpatient basis if he wishes to discuss this further, however I do not feel that he currently has any symptoms that relate to his spondylolisthesis and would not recommend any intervention unless his symptom pattern changes.             Serjio Gastelum MD

## 2020-03-11 NOTE — PROGRESS NOTES
Cardiology Progress Note Dinesh Ariza MD      Patient:  Shayne Shetty  [id number]    Patient Active Problem List    Diagnosis Date Noted    [facility]ST [facility] 03/09/2020     Priority: Low    Cigarette nicotine dependence with nicotine-induced disorder 03/09/2020     Priority: Low    Tobacco abuse counseling 03/09/2020     Priority: Low    Personal history of noncompliance with medical treatment and regimen 03/09/2020     Priority: Low    Elevated troponin I level 03/09/2020     Priority: [address] hypertension 05/16/2017     Priority: Low    DDD (degenerative disc disease), lumbosacral 03/10/2020       Admit Date:  3/9/2020    Admission Problem List: Present on Admission:   [facility]STEM[facility]non-ST elevated [facility]) [facility]   Essential hypertension   Cigarette nicotine dependence with nicotine-induced disorder   Tobacco abuse counseling   Personal history of noncompliance with medical treatment and regimen   Elevated troponin I level   DDD (degenerative disc disease), lumbosacral      Cardiac Specific Data:  Specialty Problems        Cardiology Problems    Essential hypertension        * (Principal) NSTEMI (non-ST elevated myocardial infarction) ([facility]            1. Hypertension with poor control, negative Lexiscan, normal LV ejection fraction stress test.  2. Right mid back pain, CTA negative for aortic dissection, MRI with degenerative disc disease. 3. History of noncompliance. 4. Chronic ongoing tobacco use. Subjective:  Mr. Sandeep Tao appears to be doing better. He is ambulating around the room.   Blood pressure is still not optimal.    Objective:   BP (!) 162/100   Pulse 65   Temp 97.6 °F (36.4 °C) (Temporal)   Resp 18   Ht 6' 1\" (1.854 m)   Wt 207 lb 12.8 oz (94.3 kg)   SpO2 97%   BMI 27.42 kg/m²       Intake/Output Summary (Last 24 hours) at 3/11/2020 0949  Last data filed at 3/11/2020 0003  Gross per 24 hour   Intake 200 ml   Output --   Net 200 ml Prior to Admission medications    Medication Sig Start Date End Date Taking? Authorizing Provider   lisinopril-hydrochlorothiazide (PRINZIDE;ZESTORETIC) 20-12.5 MG per tablet Take 1 tablet by mouth daily 3/13/19  Yes IRMA Chen   ondansetron (ZOFRAN ODT) 4 MG disintegrating tablet Take 1 tablet by mouth every 8 hours as needed for Nausea or Vomiting 1/30/18   IRMA Chen   sildenafil (VIAGRA) 100 MG tablet Take 1 tablet by mouth as needed for Erectile Dysfunction 5/16/17   IRMA Chen        carvedilol  6.25 mg Oral BID WC    amLODIPine  5 mg Oral BID    guaiFENesin  1,200 mg Oral BID    sodium chloride flush  10 mL Intravenous 2 times per day    famotidine  20 mg Oral BID    atorvastatin  40 mg Oral Nightly    aspirin  81 mg Oral Daily    nicotine  1 patch Transdermal Daily    lisinopril  20 mg Oral Daily    And    hydroCHLOROthiazide  25 mg Oral Daily       TELEMETRY: Sinus     Physical Exam:      Physical Exam  Constitutional:       Appearance: He is well-developed. HENT:      Mouth/Throat:      Pharynx: No oropharyngeal exudate. Eyes:      General: No scleral icterus. Right eye: No discharge. Left eye: No discharge. Neck:      Thyroid: No thyromegaly. Vascular: No JVD. Cardiovascular:      Rate and Rhythm: Normal rate and regular rhythm. Heart sounds: No murmur. No friction rub. No gallop. Pulmonary:      Effort: No respiratory distress. Breath sounds: No stridor. No wheezing or rales. Abdominal:      General: Bowel sounds are normal. There is no distension. Palpations: Abdomen is soft. There is no mass. Tenderness: There is no abdominal tenderness. There is no guarding or rebound. Musculoskeletal:         General: No deformity. Skin:     General: Skin is warm. Coloration: Skin is not pale. Findings: No erythema or rash.    Neurological:      Mental Status: He is alert and and 3-D maximum intensity projection reconstruction. There is no previous similar study available for comparison. The correlation is made with the previous chest radiograph dated 3/9/2020. There is normal opacification of the thoracic aorta. No aneurysmal dilatation or dissection. There are artifacts at the root of the aorta due to the cardiac motion and respiratory motion artifacts. This area suboptimally evaluated. There is normal origin course and caliber of the coronary arteries. No significant atheromatous changes. The cardiac chambers appear normal. There is a normal RV/LV ratio. The brachiocephalic arteries and veins are normal. There is no evidence of mediastinal or hilar mass or lymphadenopathy. There is moderate diffuse fullness of the thyroid gland. No discrete nodules. There are several small lymph nodes in the axilla bilaterally which are not enlarged by CT criteria. The lungs are moderately well-expanded. No lung nodules are seen. Small cystic areas are seen in the upper lungs bilaterally probably representing foci of a previous inflammatory process are small blebs/bullae. No jennifer emphysema. No areas of infiltrate or focal consolidation. The included abdominal organs have been reviewed and reported in a separate CT of the abdomen and pelvis. The images reviewed in bone window show no acute bony abnormality. Moderate chronic degenerative changes of thoracic spine are seen. The visualized ribs bilaterally appear normal.    No evidence of aortic aneurysm or dissection. Signed by Dr Ania Edge on 3/10/2020 2:05 PM    Mri Lumbar Spine Wo Contrast    Result Date: 3/10/2020  MRI LUMBAR SPINE WO CONTRAST 3/10/2020 7:45 AM HISTORY: Low back pain COMPARISON: None TECHNIQUE: Multiplanar, multisequence MRI of the lumbar spine was performed without the use of contrast. FINDINGS: The lumbar spine is visualized from L1 through S1.  There are presumed to be 5 lumbar-type vertebrae, with the most inferior being labeled as L5. Alignment: Lordosis is preserved. Approximately 3 mm lytic spondylolisthesis at L5-S1. Otherwise normal alignment. Marrow signal: Degenerative endplate signal changes at L2, L3 and S1. No pathologic marrow infiltrate. Vertebral body heights are well-maintained. Spondylolysis at L5. Posterior elements are otherwise intact. Cord: The conus medullaris terminates at the level of L2. The visualized spinal cord is normal in signal and morphology. Soft tissues: The surrounding soft tissues are unremarkable. Levels: L1-L2: Loss of intervertebral disc height with mild diffuse bulging. No significant spinal stenosis or neuroforaminal narrowing. L2-L3: Loss of intervertebral disc height with mild diffuse disc bulging. No significant spinal stenosis or neuroforaminal narrowing. L3-L4: Minimal disc bulging at this level. No significant spinal stenosis. Facet arthropathy results in a moderate bilateral neuroforaminal narrowing. L4-L5: No significant spinal stenosis. Facet arthropathy results in a mild bilateral neuroforaminal narrowing. L5-S1: There is a grade 1 lytic spondylolisthesis. No significant spinal stenosis. There is a severe left-sided and moderate right-sided neuroforaminal narrowing. 1. Grade 1 lytic spondylolisthesis at L5-S1. No significant spinal stenosis at this level. Severe left-sided and moderate right-sided neuroforaminal narrowing as result of the lytic spondylolisthesis. 2. Loss of disc height with endplate spurring at W3-D2 and L2-L3. No significant spinal stenosis at these levels. 3. Facet arthropathy causing moderate bilateral neuroforaminal narrowing at L3-L4. Signed by Dr Nathaly Curtis on 3/10/2020 4:56 PM    Cta Abdomen W Contrast    Result Date: 3/10/2020  Examination. CTA ABDOMEN W CONTRAST 3/10/2020 12:15 PM History: Evaluation for aortic dissection. Total DLP: 2959 mGycm. The CT angiography of the abdomen and pelvis is performed after intravenous contrast enhancement.  The images are acquired in axial plane with subsequent 2-D reconstruction coronal and sagittal planes and 3-D maximum intensity projection reconstruction. There is no previous study for comparison. The lung bases included in the study are unremarkable. The visualized and included cardiomediastinal structures are normal. A tiny low-density nodule located anteriorly in the segment 2 of the liver probably represent a small hepatic cyst. This is too small to be further characterized. The remaining liver and spleen are unremarkable. A normal gallbladder seen. No gallstones. Common bile duct is nondilated. The pancreas appear normal. The distal pancreatic duct is visualized and appears normal. Small nodule is seen in the medial limb of the left adrenal gland measuring 1.4 cm. The right adrenal gland is normal. The renal outline bilaterally is normal. No evidence of a mass. A 3 mm calculus is seen in the upper pole of the left kidney. No calculi in the right kidney. No hydronephrosis on either side. The urinary bladder is incompletely distended. No intrinsic abnormality. The prostate is moderately enlarged with intrinsic calcification. There is a fat-containing left inguinal hernia. There is a tiny fat-containing umbilical hernia. The stomach and duodenum appear normal. Small bowel is normal and nondistended. Normal appendix is noted. Moderate gas and stool are seen in the colon. There are no finding to suggest obstruction. The mild atheromatous changes of the abdominal aorta and iliac arteries are seen. No aneurysmal dilatation. No evidence of dissection. There is a mild acute angulation of the celiac axis with a mild, less than 50% narrowing and a mild poststenotic dilatation. This is probably due to a prominent left medial articular cartilage ligament. There is normal origin course and caliber of the superior mesenteric artery. There is normal origin course and caliber of dual renal arteries bilaterally.  There is normal origin course and caliber of the inferior mesenteric artery. Normal common, internal and external iliac arteries bilaterally is seen. No aneurysmal dilatation or focal stenosis. Both common femoral arteries divide into normal-appearing superficial or deep femoral arteries. Due to arterial phase image acquisition, the systemic and portal venous system is not opacified and not evaluated. There is no evidence of abdominal or pelvic lymphadenopathy. The images reviewed in bone window show chronic degenerative changes of the lumbar and visualized thoracic spine. No focal bony lesion. There is evidence of bilateral spondylolysis at L5 without a significant spondylolisthesis. No evidence of aortic aneurysm or dissection. The details are given above. A 3 mm nonobstructing left renal calculus. Left adrenal nodule. Signed by Dr Dago Alvarado on 3/10/2020 1:56 PM    Nm Myocardial Spect Rest Exercise Or Rx    Result Date: 3/10/2020  [facility] Nuclear Stress Test Report Procedure date: March 10, 2020 Indications: chest pain Procedure: Stress was performed with injection of 0.4 mg Lexiscan. Vital signs and EKG were monitored. Technetium-99 sestamibi was injected in divided doses, approximately 10 mCi and 30 mCi respectively for rest and stress imaging. The patient was discharged in stable condition. Results: Patient had symptoms of dyspnea during infusion that resolved in recovery. Baseline EKG showed normal sinus rhythm without ST/T changes. During stress there were no significant EKG changes or rhythm changes. Baseline and peak blood pressures were 181/103, and 181/103 respectively. Baseline and peak heart rates were 49 and  87 respectively. Lexiscan/[facility] Nuclear Medicine Report Date of Procedure: 3/10/2020 The patient was injected with 10.5 millicuries (mCi) of Technetium (Tc99m). After an appropriate level of stress the patient was re-injected with 53.90 millicuries (mCi) of Technetium (Tc99m).  Repeat gated images were

## 2020-03-12 ENCOUNTER — TELEPHONE (OUTPATIENT)
Dept: PRIMARY CARE CLINIC | Age: 55
End: 2020-03-12

## 2020-03-12 NOTE — TELEPHONE ENCOUNTER
[facility] Trans[facility] Initial Follow Up Call    Outreach made within 2 business days of discharge: Yes    Patient: Tammy Salvador Patient : 1965   MRN: [id number]  Reason for Admission: There are no discharge diagnoses documented for the most recent discharge.   Discharge Date: 3/11/20       Spoke with: voicemail box is full     Scheduled appointment with PCP within 7-14 days    Follow Up  Future Appointments   Date Time Provider Latia Osborn   3/18/2020 12:30 PM IRMA Nails [place]   2020  9:45 AM IRMA Ortiz Cardio [place]       Martin Rosales

## 2020-03-18 ENCOUNTER — OFFICE VISIT (OUTPATIENT)
Dept: PRIMARY CARE CLINIC | Age: 55
End: 2020-03-18
Payer: COMMERCIAL

## 2020-03-18 VITALS
BODY MASS INDEX: 28.76 KG/M2 | OXYGEN SATURATION: 95 % | TEMPERATURE: 98.5 F | DIASTOLIC BLOOD PRESSURE: 86 MMHG | SYSTOLIC BLOOD PRESSURE: 138 MMHG | WEIGHT: 217 LBS | HEIGHT: 73 IN | HEART RATE: 98 BPM

## 2020-03-18 PROCEDURE — 99406 BEHAV CHNG SMOKING 3-10 MIN: CPT | Performed by: NURSE PRACTITIONER

## 2020-03-18 PROCEDURE — 99495 TRANSJ CARE MGMT MOD F2F 14D: CPT | Performed by: NURSE PRACTITIONER

## 2020-03-18 PROCEDURE — 1111F DSCHRG MED/CURRENT MED MERGE: CPT | Performed by: NURSE PRACTITIONER

## 2020-03-18 RX ORDER — NICOTINE 21 MG/24HR
1 PATCH, TRANSDERMAL 24 HOURS TRANSDERMAL DAILY
Qty: 30 PATCH | Refills: 1 | Status: SHIPPED | OUTPATIENT
Start: 2020-03-18 | End: 2020-04-29

## 2020-03-18 ASSESSMENT — ENCOUNTER SYMPTOMS
NAUSEA: 0
SHORTNESS OF BREATH: 0
DIARRHEA: 0
TROUBLE SWALLOWING: 0
RHINORRHEA: 0
ABDOMINAL PAIN: 0
CONSTIPATION: 0
SORE THROAT: 0
BACK PAIN: 1
VOMITING: 0
COUGH: 0

## 2020-03-18 ASSESSMENT — PATIENT HEALTH QUESTIONNAIRE - PHQ9
SUM OF ALL RESPONSES TO PHQ9 QUESTIONS 1 & 2: 0
SUM OF ALL RESPONSES TO PHQ QUESTIONS 1-9: 0
2. FEELING DOWN, DEPRESSED OR HOPELESS: 0
SUM OF ALL RESPONSES TO PHQ QUESTIONS 1-9: 0
1. LITTLE INTEREST OR PLEASURE IN DOING THINGS: 0

## 2020-03-18 NOTE — PROGRESS NOTES
Taking for the 3/18/20 encounter (Office Visit) with IRMA Velasco   Medication Sig Dispense Refill    nicotine (NICODERM CQ) 21 MG/24HR Place 1 patch onto the skin daily 30 patch 1    atorvastatin (LIPITOR) 40 MG tablet Take 1 tablet by mouth nightly 30 tablet 0    aspirin 81 MG chewable tablet Take 1 tablet by mouth daily 30 tablet 0    carvedilol (COREG) 6.25 MG tablet Take 1 tablet by mouth 2 times daily (with meals) 60 tablet 0    amLODIPine (NORVASC) 5 MG tablet Take 1 tablet by mouth 2 times daily 30 tablet 0    lisinopril-hydrochlorothiazide (PRINZIDE;ZESTORETIC) 20-12.5 MG per tablet Take 1 tablet by mouth daily 30 tablet 11    sildenafil (VIAGRA) 100 MG tablet Take 1 tablet by mouth as needed for Erectile Dysfunction 10 tablet 3        Medications patient taking as of now reconciled against medications ordered at time of hospital discharge: Yes    Chief Complaint   Patient presents with    Follow-Up from Hospital     follow up from [facility] 2 days. would like rx for nicotine patches. continues to have mid back pain. HPI    Inpatient course: Discharge summary reviewed- see chart. Per Dr Dianne Campos note 03/09/2020  Hospital Course:               3/9/2020 [place]. German Gaucher is a 82-year-old male with past medical history of tobacco abuse, irritable bowel syndrome, hypertension, GERD, chronic back pain, and erectile dysfunction. Patient was transferred to [facility] from [facility] where he presented with complaints of chest pain. Troponin at the outlying facility was 0.17. Patient was transferred to our facility for higher level of care and cardiology evaluation. He was started on low-dose heparin as per protocol, cardiac monitoring and cardiac evaluation in the morning. 3/10/2020 Cardiology evaluated patient and noted elevated blood pressure recommended echocardiogram as well as Lexiscan to rule out ischemia.   Cardiology added Norvasc 5 mg twice a day and holding off on beta-blocker since he is bradycardic. Patient is a heavy tobacco abuser. CTA of the aorta to rule out dissection was also ordered. Sandra scan negative with normal LV EF. Patient complaining of low back pain MRI was ordered showing degenerative disc disease and neurosurgical consult was placed. 3/11/2020 CTA negative for aortic dissection. Cardiology added Coreg 6.25 mg twice daily continuing Norvasc and lisinopril. Smoking cessation is again discussed with patient by cardiology. Neurosurgery evaluated the patient and recommends no neurosurgical intervention due to no symptoms that relate to spondylolisthesis    Significant Diagnostic Studies:   Xr Chest Standard (2 Vw)     Result Date: 3/9/2020  XR CHEST (2 VW) 3/9/2020 8:00 PM Two-view chest: History: STEMI Frontal and lateral projection chest radiograph obtained. Comparison:  none Findings: The lungs are clear without infiltrates. The heart is normal in size, pulmonary circulation appropriate, without heart failure. There is mild ectasia of the descending thoracic aorta. The bony structures are intact without acute osseous abnormality. .                                                                                     Impression: 1. No acute cardiopulmonary process. Signed by Dr Joana Garcia on 3/9/2020 9:31 PM     Cta Chest W Wo Contrast     Result Date: 3/10/2020  Examination. CTA CHEST W WO CONTRAST 3/10/2020 12:15 PM History: Evaluation for aortic dissection. DLP: 1479.5 mGycm. The CT angiography of the chest is performed after intravenous contrast enhancement. The images are acquired in axial plane with subsequent 2-D reconstruction coronal and sagittal planes and 3-D maximum intensity projection reconstruction. There is no previous similar study available for comparison. The correlation is made with the previous chest radiograph dated 3/9/2020.  There is normal opacification of the thoracic aorta. No aneurysmal dilatation or dissection. There are artifacts at the root of the aorta due to the cardiac motion and respiratory motion artifacts. This area suboptimally evaluated. There is normal origin course and caliber of the coronary arteries. No significant atheromatous changes. The cardiac chambers appear normal. There is a normal RV/LV ratio. The brachiocephalic arteries and veins are normal. There is no evidence of mediastinal or hilar mass or lymphadenopathy. There is moderate diffuse fullness of the thyroid gland. No discrete nodules. There are several small lymph nodes in the axilla bilaterally which are not enlarged by CT criteria. The lungs are moderately well-expanded. No lung nodules are seen. Small cystic areas are seen in the upper lungs bilaterally probably representing foci of a previous inflammatory process are small blebs/bullae. No jennifer emphysema. No areas of infiltrate or focal consolidation. The included abdominal organs have been reviewed and reported in a separate CT of the abdomen and pelvis. The images reviewed in bone window show no acute bony abnormality. Moderate chronic degenerative changes of thoracic spine are seen. The visualized ribs bilaterally appear normal.     No evidence of aortic aneurysm or dissection. Signed by Dr Esperanza Lee on 3/10/2020 2:05 PM     Mri Lumbar Spine Wo Contrast     Result Date: 3/10/2020  MRI LUMBAR SPINE WO CONTRAST 3/10/2020 7:45 AM HISTORY: Low back pain COMPARISON: None TECHNIQUE: Multiplanar, multisequence MRI of the lumbar spine was performed without the use of contrast. FINDINGS: The lumbar spine is visualized from L1 through S1. There are presumed to be 5 lumbar-type vertebrae, with the most inferior being labeled as L5. Alignment: Lordosis is preserved. Approximately 3 mm lytic spondylolisthesis at L5-S1. Otherwise normal alignment. Marrow signal: Degenerative endplate signal changes at L2, L3 and S1. No pathologic marrow infiltrate. unremarkable. The visualized and included cardiomediastinal structures are normal. A tiny low-density nodule located anteriorly in the segment 2 of the liver probably represent a small hepatic cyst. This is too small to be further characterized. The remaining liver and spleen are unremarkable. A normal gallbladder seen. No gallstones. Common bile duct is nondilated. The pancreas appear normal. The distal pancreatic duct is visualized and appears normal. Small nodule is seen in the medial limb of the left adrenal gland measuring 1.4 cm. The right adrenal gland is normal. The renal outline bilaterally is normal. No evidence of a mass. A 3 mm calculus is seen in the upper pole of the left kidney. No calculi in the right kidney. No hydronephrosis on either side. The urinary bladder is incompletely distended. No intrinsic abnormality. The prostate is moderately enlarged with intrinsic calcification. There is a fat-containing left inguinal hernia. There is a tiny fat-containing umbilical hernia. The stomach and duodenum appear normal. Small bowel is normal and nondistended. Normal appendix is noted. Moderate gas and stool are seen in the colon. There are no finding to suggest obstruction. The mild atheromatous changes of the abdominal aorta and iliac arteries are seen. No aneurysmal dilatation. No evidence of dissection. There is a mild acute angulation of the celiac axis with a mild, less than 50% narrowing and a mild poststenotic dilatation. This is probably due to a prominent left medial articular cartilage ligament. There is normal origin course and caliber of the superior mesenteric artery. There is normal origin course and caliber of dual renal arteries bilaterally. There is normal origin course and caliber of the inferior mesenteric artery. Normal common, internal and external iliac arteries bilaterally is seen. No aneurysmal dilatation or focal stenosis.  Both common femoral arteries divide into normal-appearing superficial or deep femoral arteries. Due to arterial phase image acquisition, the systemic and portal venous system is not opacified and not evaluated. There is no evidence of abdominal or pelvic lymphadenopathy. The images reviewed in bone window show chronic degenerative changes of the lumbar and visualized thoracic spine. No focal bony lesion. There is evidence of bilateral spondylolysis at L5 without a significant spondylolisthesis.     No evidence of aortic aneurysm or dissection. The details are given above. A 3 mm nonobstructing left renal calculus. Left adrenal nodule. Signed by Dr Maria Isabel Galvan on 3/10/2020 1:56 PM     Nm Myocardial Spect Rest Exercise Or Rx     Result Date: 3/10/2020  [facility] Nuclear Stress Test Report Procedure date: March 10, 2020 Indications: chest pain Procedure: Stress was performed with injection of 0.4 mg Lexiscan. Vital signs and EKG were monitored. Technetium-99 sestamibi was injected in divided doses, approximately 10 mCi and 30 mCi respectively for rest and stress imaging. The patient was discharged in stable condition. Results: Patient had symptoms of dyspnea during infusion that resolved in recovery. Baseline EKG showed normal sinus rhythm without ST/T changes. During stress there were no significant EKG changes or rhythm changes. Baseline and peak blood pressures were 181/103, and 181/103 respectively. Baseline and peak heart rates were 49 and  87 respectively. Lexiscan/[facility]iolyte Nuclear Medicine Report Date of Procedure: 3/10/2020 The patient was injected with 34.3 millicuries (mCi) of Technetium (Tc99m). After an appropriate level of stress the patient was re-injected with 52.44 millicuries (mCi) of Technetium (Tc99m). Repeat gated images were then performed per standard protocol. Findings: 1. Analysis of the the stress and rest images reveals no obvious defects on stress and rest images.  2.  Analysis of the gated images reveals grossly normal left ventricular Constitutional:       Appearance: Normal appearance. HENT:      Head: Normocephalic and atraumatic. Eyes:      Conjunctiva/sclera: Conjunctivae normal.   Neck:      Musculoskeletal: Normal range of motion and neck supple. Cardiovascular:      Rate and Rhythm: Normal rate and regular rhythm. Pulses: Normal pulses. Heart sounds: Normal heart sounds. Pulmonary:      Effort: Pulmonary effort is normal.      Breath sounds: Normal breath sounds. Abdominal:      General: Bowel sounds are normal. There is no distension. Palpations: Abdomen is soft. Tenderness: There is no abdominal tenderness. There is no guarding. Musculoskeletal: Normal range of motion. Right shoulder: He exhibits normal range of motion, no tenderness and no bony tenderness. Thoracic back: He exhibits normal range of motion, no tenderness and no bony tenderness. Skin:     General: Skin is warm. Neurological:      Mental Status: He is alert and oriented to person, place, and time. Psychiatric:         Mood and Affect: Mood normal.         Behavior: Behavior normal.         Thought Content: Thought content normal.         Judgment: Judgment normal.             Assessment/Plan:  1. NSTEMI (non-ST elevated myocardial infarction) ([facility] [address]    - UT DISCHARGE MEDS RECONCILED W/ CURRENT OUTPATIENT MED LIST    2. Cigarette nicotine dependence with nicotine-induced disorder    - nicotine (NICODERM CQ) 21 MG/24HR; Place 1 patch onto the skin daily  Dispense: 30 patch; Refill: 1  - UT TOBACCO USE CESSATION INTERMEDIATE 3-10 MINUTES    3. Essential hypertension      4.  Mixed hyperlipidemia          Medical Decision Making: moderate complexity

## 2020-03-18 NOTE — PATIENT INSTRUCTIONS
Patient Education        Learning About Benefits From Quitting Smoking  How does quitting smoking make you healthier? If you're thinking about quitting smoking, you may have a few reasons to be smoke-free. Your health may be one of them. · When you quit smoking, you lower your risks for cancer, lung disease, heart attack, stroke, blood vessel disease, and blindness from macular degeneration. · When you're smoke-free, you get sick less often, and you heal faster. You are less likely to get colds, flu, bronchitis, and pneumonia. · As a nonsmoker, you may find that your mood is better and you are less stressed. When and how will you feel healthier? Quitting has real health benefits that start from day 1 of being smoke-free. And the longer you stay smoke-free, the healthier you get and the better you feel. The first hours  · After just 20 minutes, your blood pressure and heart rate go down. That means there's less stress on your heart and blood vessels. · Within 12 hours, the level of carbon monoxide in your blood drops back to normal. That makes room for more oxygen. With more oxygen in your body, you may notice that you have more energy than when you smoked. After 2 weeks  · Your lungs start to work better. · Your risk of heart attack starts to drop. After 1 month  · When your lungs are clear, you cough less and breathe deeper, so it's easier to be active. · Your sense of taste and smell return. That means you can enjoy food more than you have since you started smoking. Over the years  · After 1 year, your risk of heart disease is half what it would be if you kept smoking. · After 5 years, your risk of stroke starts to shrink. Within a few years after that, it's about the same as if you'd never smoked. · After 10 years, your risk of dying from lung cancer is cut by about half. And your risk for many other types of cancer is lower too. How would quitting help others in your life?   When you quit the Search Health Information box to learn more about \"Deciding About Using Medicines To Quit Smoking. \"     If you do not have an account, please click on the \"Sign Up Now\" link. Current as of: July 4, 2019Content Version: 12.4  © 3567-5937 Healthwise, Incorporated. Care instructions adapted under license by [facility] ([facility]). If you have questions about a medical condition or this instruction, always ask your healthcare professional. [address] any warranty or liability for your use of this information. Patient Education        Stopping Smoking: Care Instructions  Your Care Instructions  Cigarette smokers crave the nicotine in cigarettes. Giving it up is much harder than simply changing a habit. Your body has to stop craving the nicotine. It is hard to quit, but you can do it. There are many tools that people use to quit smoking. You may find that combining tools works best for you. There are several steps to quitting. First you get ready to quit. Then you get support to help you. After that, you learn new skills and behaviors to become a nonsmoker. For many people, a necessary step is getting and using medicine. Your doctor will help you set up the plan that best meets your needs. You may want to attend a smoking cessation program to help you quit smoking. When you choose a program, look for one that has proven success. Ask your doctor for ideas. You will greatly increase your chances of success if you take medicine as well as get counseling or join a cessation program.  Some of the changes you feel when you first quit tobacco are uncomfortable. Your body will miss the nicotine at first, and you may feel short-tempered and grumpy. You may have trouble sleeping or concentrating. Medicine can help you deal with these symptoms. You may struggle with changing your smoking habits and rituals. The last step is the tricky one: Be prepared for the smoking urge to continue for a time.  This is a lot to deal with, but keep at it. You will feel better. Follow-up care is a key part of your treatment and safety. Be sure to make and go to all appointments, and call your doctor if you are having problems. It's also a good idea to know your test results and keep a list of the medicines you take. How can you care for yourself at home? · Ask your family, friends, and coworkers for support. You have a better chance of quitting if you have help and support. · Join a support group, such as Nicotine Anonymous, for people who are trying to quit smoking. · Consider signing up for a smoking cessation program, such as the American Lung Association's Freedom from Smoking program.  · Get text messaging support. Go to the website at www.smokefree. gov to sign up for the [facility] program.  · Set a quit date. Pick your date carefully so that it is not right in the middle of a big deadline or stressful time. Once you quit, do not even take a puff. Get rid of all ashtrays and lighters after your last cigarette. Clean your house and your clothes so that they do not smell of smoke. · Learn how to be a nonsmoker. Think about ways you can avoid those things that make you reach for a cigarette. ? Avoid situations that put you at greatest risk for smoking. For some people, it is hard to have a drink with friends without smoking. For others, they might skip a coffee break with coworkers who smoke. ? Change your daily routine. Take a different route to work or eat a meal in a different place. · Cut down on stress. Calm yourself or release tension by doing an activity you enjoy, such as reading a book, taking a hot bath, or gardening. · Talk to your doctor or pharmacist about nicotine replacement therapy, which replaces the nicotine in your body. You still get nicotine but you do not use tobacco. Nicotine replacement products help you slowly reduce the amount of nicotine you need.  These products come in several forms, many

## 2020-03-26 RX ORDER — LISINOPRIL AND HYDROCHLOROTHIAZIDE 20; 12.5 MG/1; MG/1
1 TABLET ORAL DAILY
Qty: 30 TABLET | Refills: 11 | Status: SHIPPED | OUTPATIENT
Start: 2020-03-26 | End: 2021-04-07

## 2020-03-26 RX ORDER — ATORVASTATIN CALCIUM 40 MG/1
40 TABLET, FILM COATED ORAL NIGHTLY
Qty: 30 TABLET | Refills: 0 | Status: SHIPPED | OUTPATIENT
Start: 2020-03-26 | End: 2020-04-29

## 2020-03-26 RX ORDER — AMLODIPINE BESYLATE 5 MG/1
5 TABLET ORAL 2 TIMES DAILY
Qty: 30 TABLET | Refills: 0 | Status: SHIPPED | OUTPATIENT
Start: 2020-03-26 | End: 2020-04-24

## 2020-03-26 RX ORDER — CARVEDILOL 6.25 MG/1
6.25 TABLET ORAL 2 TIMES DAILY WITH MEALS
Qty: 60 TABLET | Refills: 0 | Status: SHIPPED | OUTPATIENT
Start: 2020-03-26 | End: 2020-04-29

## 2020-04-08 ENCOUNTER — TELEPHONE (OUTPATIENT)
Dept: CARDIOLOGY | Age: 55
End: 2020-04-08

## 2020-04-09 ENCOUNTER — TELEMEDICINE (OUTPATIENT)
Dept: CARDIOLOGY | Age: 55
End: 2020-04-09

## 2020-04-09 VITALS — WEIGHT: 210 LBS | DIASTOLIC BLOOD PRESSURE: 82 MMHG | SYSTOLIC BLOOD PRESSURE: 139 MMHG | BODY MASS INDEX: 27.71 KG/M2

## 2020-04-09 PROBLEM — F17.200 SMOKER: Status: ACTIVE | Noted: 2020-04-09

## 2020-04-09 PROBLEM — E78.2 MIXED HYPERLIPIDEMIA: Status: ACTIVE | Noted: 2020-04-09

## 2020-04-09 PROCEDURE — 99213 OFFICE O/P EST LOW 20 MIN: CPT | Performed by: NURSE PRACTITIONER

## 2020-04-09 PROCEDURE — 99406 BEHAV CHNG SMOKING 3-10 MIN: CPT | Performed by: NURSE PRACTITIONER

## 2020-04-09 NOTE — PATIENT INSTRUCTIONS
shortening-you eat. Use olive, peanut, or canola oil when you cook. Bake, broil, and steam foods instead of frying them.     · Eat a variety of fruit and vegetables every day. Dark green, deep orange, red, or yellow fruits and vegetables are especially good for you. Examples include spinach, carrots, peaches, and berries.     · Foods high in fiber can reduce your cholesterol and provide important vitamins and minerals. High-fiber foods include whole-grain cereals and breads, oatmeal, beans, brown rice, citrus fruits, and apples.     · Eat lean proteins. Heart-healthy proteins include seafood, lean meats and poultry, eggs, beans, peas, nuts, seeds, and soy products.     · Limit drinks and foods with added sugar. These include candy, desserts, and soda pop.    Lifestyle changes    · If your doctor recommends it, get more exercise. Walking is a good choice. Bit by bit, increase the amount you walk every day. Try for at least 30 minutes on most days of the week. You also may want to swim, bike, or do other activities.     · Do not smoke. If you need help quitting, talk to your doctor about stop-smoking programs and medicines. These can increase your chances of quitting for good. Quitting smoking may be the most important step you can take to protect your heart. It is never too late to quit. You will get health benefits right away.     · Limit alcohol to 2 drinks a day for men and 1 drink a day for women. Too much alcohol can cause health problems. Medicines    · Take your medicines exactly as prescribed. Call your doctor if you think you are having a problem with your medicine.     · If your doctor recommends aspirin, take the amount directed each day. Make sure you take aspirin and not another kind of pain reliever, such as acetaminophen (Tylenol). If you take ibuprofen (such as Advil or Motrin) for other problems, take aspirin at least 2 hours before taking ibuprofen. When should you call for help?   Call 911 if you have symptoms of a heart attack. These may include:    · Chest pain or pressure, or a strange feeling in the chest.     · Sweating.     · Shortness of breath.     · Pain, pressure, or a strange feeling in the back, neck, jaw, or upper belly or in one or both shoulders or arms.     · Lightheadedness or sudden weakness.     · A fast or irregular heartbeat.    After you call  911, the  may tell you to chew 1 adult-strength or 2 to 4 low-dose aspirin. Wait for an ambulance. Do not try to drive yourself.   Watch closely for changes in your health, and be sure to contact your doctor if you have any problems. Where can you learn more? Go to https://[employer] org and sign in to your [employer] account. Enter F570 in the [employer] box to learn more about \"A Healthy Heart: Care Instructions. \"     If you do not have an account, please click on the \"Sign Up Now\" link. Current as of: December 15, 2019Content Version: 12.4  © 1698-4843 Healthwise, Incorporated. Care instructions adapted under license by [facility] ([facility]). If you have questions about a medical condition or this instruction, always ask your healthcare professional. [address] any warranty or liability for your use of this information. Patient Education        Learning About Benefits From Quitting Smoking  How does quitting smoking make you healthier? If you're thinking about quitting smoking, you may have a few reasons to be smoke-free. Your health may be one of them. · When you quit smoking, you lower your risks for cancer, lung disease, heart attack, stroke, blood vessel disease, and blindness from macular degeneration. · When you're smoke-free, you get sick less often, and you heal faster. You are less likely to get colds, flu, bronchitis, and pneumonia. · As a nonsmoker, you may find that your mood is better and you are less stressed. When and how will you feel healthier?   Quitting Quitting Smoking. \"     If you do not have an account, please click on the \"Sign Up Now\" link. Current as of: July 4, 2019Content Version: 12.4  © 9700-8216 Healthwise, Incorporated. Care instructions adapted under license by [facility] ([facility]). If you have questions about a medical condition or this instruction, always ask your healthcare professional. [address] any warranty or liability for your use of this information. QUIT [facility] SMOKING CESSATION PROGRAM    How Do I Get Started  Quitting tobacco is a process. The first step is the hardest. When you are ready to quit, [employer] [place] can help you with each step in the quitting process. The Program  Q[employer] Now [place] is a FREE online service available to [place] residents 13years of age and over. When you become a member, you get special tools, a support team of coaches, research-based information, and a community of others trying to become tobacco free. Our expert coaches can talk to you about overcoming common barriers, such as dealing with stress, fighting cravings, coping with irritability, and controlling weight gain. We also offer a free telephone service, so you can speak to a  in person, if you would prefer. Call the Heather at [facility] or [phone]. What if I don't qualify for the Program?  You must be 13years of age or older to participate in the program. It is also helpful to discuss quitting with your doctor. How do I enroll in the Quit Now [place] online program?  Complete the brief Enroll Now form. If you are a [place] resident over 13years of age, you will receive an email letting you know that you are enrolled. You can use the online service as often as you want! How do I enroll in both the online and telephone program?  Complete the brief Enroll Now form. If you qualify, you will receive an email letting you know that you are enrolled.  You can use the online service as often as you

## 2020-04-09 NOTE — PROGRESS NOTES
Headaches     Other Mother         Crohn's     Social History     Tobacco Use    Smoking status: Current Every Day Smoker     Packs/day: 1.00    Smokeless tobacco: Former User   Substance Use Topics    Alcohol use: Yes     Comment: [email or web address]      Current Outpatient Medications   Medication Sig Dispense Refill    atorvastatin (LIPITOR) 40 MG tablet Take 1 tablet by mouth nightly 30 tablet 0    carvedilol (COREG) 6.25 MG tablet Take 1 tablet by mouth 2 times daily (with meals) 60 tablet 0    amLODIPine (NORVASC) 5 MG tablet Take 1 tablet by mouth 2 times daily 30 tablet 0    lisinopril-hydroCHLOROthiazide (PRINZIDE;ZESTORETIC) 20-12.5 MG per tablet Take 1 tablet by mouth daily 30 tablet 11    nicotine (NICODERM CQ) 21 MG/24HR Place 1 patch onto the skin daily 30 patch 1    aspirin 81 MG chewable tablet Take 1 tablet by mouth daily 30 tablet 0    sildenafil (VIAGRA) 100 MG tablet Take 1 tablet by mouth as needed for Erectile Dysfunction 10 tablet 3     No current facility-administered medications for this visit. July Bach (:  1965) has requested an audio/video evaluation for the following concern(s):    Allergies: Codeine    Review of Systems  Constitutional - no appetite change, or unexpected weight change. No fever, chills or diaphoresis. No significant change in activity level or new onset of fatigue. HEENT - no significant rhinorrhea or epistaxis. No tinnitus or significant hearing loss. Eyes - no sudden vision change or amaurosis. No corneal arcus, xantholasma, subconjunctival hemorrhage or discharge. Respiratory - no significant wheezing, stridor, apnea or cough. No dyspnea on exertion or shortness of air. Cardiovascular - no exertional chest pain to suggest myocardial ischemia. No orthopnea or PND. No sensation of sustained arrythmia. No occurrence of slow heart rate. No palpitations. No claudication. Gastrointestinal - no abdominal swelling or pain.  No blood in ----------------------------------------------------------------   Electronically signed by Sharon Epps MD(Interpreting   physician) on 03/11/2020 03:28 PM   ----------------------------------------------------------------    Lab Results   Component Value Date     03/11/2020    K 4.4 03/11/2020    CL 99 03/11/2020    CO2 27 03/11/2020    BUN 13 03/11/2020    CREATININE 0.9 03/11/2020    GLUCOSE 104 03/11/2020    CALCIUM 9.8 03/11/2020      . Lab Results   Component Value Date    CHOL 150 (L) 03/10/2020     Lab Results   Component Value Date    TRIG 223 (H) 03/10/2020     Lab Results   Component Value Date    HDL 57 03/10/2020     Lab Results   Component Value Date    LDLCALC 48 03/10/2020     PHYSICAL EXAMINATION:  [ INSTRUCTIONS:  \"[x]\" Indicates a positive item  \"[]\" Indicates a negative item  -- DELETE ALL ITEMS NOT EXAMINED]  Vital Signs: (As obtained by patient/caregiver or practitioner observation)  BP Readings from Last 3 Encounters:   04/09/20 139/82   03/18/20 138/86   03/11/20 (!) 160/97     Blood pressure-  Heart rate-    Respiratory rate-    Temperature-  Pulse oximetry-     Constitutional: [x] Appears well-developed and well-nourished [x] No apparent distress      [] Abnormal-   Mental status  [x] Alert and awake  [x] Oriented to person/place/time [x]Able to follow commands      Eyes:  EOM    [x]  Normal  [] Abnormal-  Sclera  [x]  Normal  [] Abnormal -         Discharge [x]  None visible  [] Abnormal -    HENT:   [x] Normocephalic, atraumatic. [] Abnormal   [x] Mouth/Throat: Mucous membranes are moist.     External Ears [x] Normal  [] Abnormal-     Neck: [x] No visualized mass or JVD.       Pulmonary/Chest: [x] Respiratory effort normal.  [x] No visualized signs of difficulty breathing or respiratory distress        [] Abnormal-      Musculoskeletal:   [x] Normal gait with no signs of ataxia         [x] Normal range of motion of neck        [] Abnormal-       Neurological:        [] No Facial Asymmetry (Cranial nerve 7 motor function) (limited exam to video visit)          [] No gaze palsy        [] Abnormal-         Skin:        [x] No significant exanthematous lesions or discoloration noted on facial skin         [] Abnormal-            Psychiatric:       [x] Normal Affect [] No Hallucinations        [] Abnormal-     Other pertinent observable physical exam findings-     BP Readings from Last 3 Encounters:   03/18/20 138/86   03/11/20 (!) 160/97   01/30/18 (!) 166/104       Wt Readings from Last 3 Encounters:   03/18/20 217 lb (98.4 kg)   03/11/20 207 lb 12.8 oz (94.3 kg)   01/30/18 258 lb (117 kg)     Due to this being a TeleHealth encounter, evaluation of the following organ systems is limited: Vitals/Constitutional/EENT/Resp/CV/GI//MS/Neuro/Skin/Heme-Lymph-Imm. ASSESSMENT/PLAN:   Diagnosis Orders   1. Essential hypertension     2. Mixed hyperlipidemia     3. Smoker       Stable CV status without overt heart failure, sensed arrhythmia or angina. HTN - normalized BP on current regimen. Hyperlipidemia - followed by PCP. Tolerating Lipitor well. Smoker - has reduced daily amount by half using nicoderm patches. 3 minute spent on importance of smoking cessation. Educational material provided on health benefit and contact for [employer]. Patient is compliant with medication regimen. Plan  Previous cardiac history and records reviewed. Continue current medical management. Continue other current medications as directed. Continue to follow up with primary care provider for non cardiac medical problems. Call the office with any problems, questions or concerns at [phone]. Cardiology follow up: Dr. Sharon Leslie 6 months. Educational included in patient instructions. Heart health. Health benefit smoking cessation. Contact for [employer]. An  electronic signature was used to authenticate this note.     --IRMA Parikh on 4/9/2020 at 2:25 PM        Pursuant to the emergency

## 2020-04-24 RX ORDER — AMLODIPINE BESYLATE 5 MG/1
5 TABLET ORAL 2 TIMES DAILY
Qty: 60 TABLET | Refills: 5 | Status: SHIPPED | OUTPATIENT
Start: 2020-04-24 | End: 2020-09-08

## 2020-04-24 NOTE — TELEPHONE ENCOUNTER
Received fax from pharmacy requesting refill on pts medication(s). Pt was last seen in office on 3/18/2020  and has a follow up scheduled for 6/18/2020. Will send request to  Claudell Salk  for patient.      Requested Prescriptions     Pending Prescriptions Disp Refills    amLODIPine (NORVASC) 5 MG tablet [Pharmacy Med Name: AMLODIPINE BESYLATE 5 MG TAB] 30 tablet 0     Sig: TAKE 1 TABLET BY MOUTH TWICE A DAY

## 2020-04-29 RX ORDER — ATORVASTATIN CALCIUM 40 MG/1
40 TABLET, FILM COATED ORAL NIGHTLY
Qty: 30 TABLET | Refills: 11 | Status: SHIPPED | OUTPATIENT
Start: 2020-04-29 | End: 2021-04-19

## 2020-04-29 RX ORDER — CARVEDILOL 6.25 MG/1
6.25 TABLET ORAL 2 TIMES DAILY WITH MEALS
Qty: 60 TABLET | Refills: 11 | Status: SHIPPED | OUTPATIENT
Start: 2020-04-29 | End: 2021-04-19

## 2020-04-29 NOTE — TELEPHONE ENCOUNTER
Received fax from pharmacy requesting refill on pts medication(s). Pt was last seen in office on 3/18/2020  and has a follow up scheduled for 6/18/2020. Will send request to  Rissa Mccarthy  for patient.      Requested Prescriptions     Pending Prescriptions Disp Refills    carvedilol (COREG) 6.25 MG tablet [Pharmacy Med Name: CARVEDILOL 6.25 MG TABLET] 60 tablet 0     Sig: TAKE 1 TABLET BY MOUTH TWICE A DAY WITH MEALS    atorvastatin (LIPITOR) 40 MG tablet [Pharmacy Med Name: ATORVASTATIN 40 MG TABLET] 30 tablet 0     Sig: TAKE 1 TABLET BY MOUTH EVERY DAY AT NIGHT

## 2020-09-08 RX ORDER — AMLODIPINE BESYLATE 5 MG/1
5 TABLET ORAL DAILY
Qty: 60 TABLET | Refills: 5 | Status: SHIPPED | OUTPATIENT
Start: 2020-09-08 | End: 2021-06-23 | Stop reason: SDUPTHER

## 2020-09-08 NOTE — TELEPHONE ENCOUNTER
Received fax from pharmacy requesting refill on pts medication(s). Pt was last seen in office on 6/25/2020  and has a follow up scheduled for Visit date not found. Will send request to  Adin Garcia  for authorization.      Requested Prescriptions     Pending Prescriptions Disp Refills    amLODIPine (NORVASC) 5 MG tablet [Pharmacy Med Name: AMLODIPINE BESYLATE 5 MG TAB] 60 tablet 5     Sig: Take 1 tablet by mouth daily

## 2020-10-07 ENCOUNTER — TELEPHONE (OUTPATIENT)
Dept: CARDIOLOGY | Age: 55
End: 2020-10-07

## 2020-10-07 NOTE — TELEPHONE ENCOUNTER
Called and left message for patient letting them know we need to reschedule their appt as Dr. Alba Mcmanus will be out of office. If patient calls back please R/S with any APRN.

## 2020-10-08 ENCOUNTER — TELEPHONE (OUTPATIENT)
Dept: CARDIOLOGY | Age: 55
End: 2020-10-08

## 2021-04-07 DIAGNOSIS — I21.4 NSTEMI (NON-ST ELEVATED MYOCARDIAL INFARCTION) (HCC): ICD-10-CM

## 2021-04-07 DIAGNOSIS — E78.2 MIXED HYPERLIPIDEMIA: ICD-10-CM

## 2021-04-07 DIAGNOSIS — I10 ESSENTIAL HYPERTENSION: ICD-10-CM

## 2021-04-07 RX ORDER — LISINOPRIL AND HYDROCHLOROTHIAZIDE 20; 12.5 MG/1; MG/1
1 TABLET ORAL DAILY
Qty: 30 TABLET | Refills: 0 | Status: SHIPPED | OUTPATIENT
Start: 2021-04-07 | End: 2021-06-23 | Stop reason: SDUPTHER

## 2021-04-07 NOTE — TELEPHONE ENCOUNTER
Received fax from pharmacy requesting refill on pts medication(s). Pt was last seen in office on 6/25/2020  and has a follow up scheduled for Visit date not found. Will send request to  Jessi Velázquez  for patient.      Requested Prescriptions     Pending Prescriptions Disp Refills    lisinopril-hydroCHLOROthiazide (PRINZIDE;ZESTORETIC) 20-12.5 MG per tablet [Pharmacy Med Name: LISINOPRIL-HCTZ 20-12.5 MG TAB] 90 tablet 3     Sig: TAKE 1 TABLET BY MOUTH EVERY DAY

## 2021-04-16 DIAGNOSIS — I21.4 NSTEMI (NON-ST ELEVATED MYOCARDIAL INFARCTION) (HCC): ICD-10-CM

## 2021-04-16 DIAGNOSIS — E78.2 MIXED HYPERLIPIDEMIA: ICD-10-CM

## 2021-04-16 DIAGNOSIS — I10 ESSENTIAL HYPERTENSION: ICD-10-CM

## 2021-04-17 DIAGNOSIS — E78.2 MIXED HYPERLIPIDEMIA: ICD-10-CM

## 2021-04-17 DIAGNOSIS — I21.4 NSTEMI (NON-ST ELEVATED MYOCARDIAL INFARCTION) (HCC): ICD-10-CM

## 2021-04-17 DIAGNOSIS — I10 ESSENTIAL HYPERTENSION: ICD-10-CM

## 2021-04-19 RX ORDER — CARVEDILOL 6.25 MG/1
TABLET ORAL
Qty: 60 TABLET | Refills: 0 | Status: SHIPPED | OUTPATIENT
Start: 2021-04-19 | End: 2021-06-23 | Stop reason: SDUPTHER

## 2021-04-19 RX ORDER — ATORVASTATIN CALCIUM 40 MG/1
TABLET, FILM COATED ORAL
Qty: 30 TABLET | Refills: 0 | Status: SHIPPED | OUTPATIENT
Start: 2021-04-19 | End: 2021-06-23 | Stop reason: SDUPTHER

## 2021-04-19 NOTE — TELEPHONE ENCOUNTER
Received fax from pharmacy requesting refill on pts medication(s). Pt was last seen in office on 6/25/2020  and has a follow up scheduled for Visit date not found. Will send request to  Dian Marti  for authorization.      Requested Prescriptions     Pending Prescriptions Disp Refills    atorvastatin (LIPITOR) 40 MG tablet [Pharmacy Med Name: ATORVASTATIN 40 MG TABLET] 30 tablet 0     Sig: TAKE 1 TABLET BY MOUTH EVERY DAY AT NIGHT

## 2021-04-19 NOTE — TELEPHONE ENCOUNTER
Received fax from pharmacy requesting refill on pts medication(s). Pt was last seen in office on 6/25/2020  and has a follow up scheduled for 4/17/2021. Will send request to  Brandon De Santiago  for authorization.      Requested Prescriptions     Pending Prescriptions Disp Refills    carvedilol (COREG) 6.25 MG tablet [Pharmacy Med Name: CARVEDILOL 6.25 MG TABLET] 60 tablet 0     Sig: TAKE 1 TABLET BY MOUTH TWICE A DAY WITH MEALS

## 2021-05-08 DIAGNOSIS — I21.4 NSTEMI (NON-ST ELEVATED MYOCARDIAL INFARCTION) (HCC): ICD-10-CM

## 2021-05-08 DIAGNOSIS — I10 ESSENTIAL HYPERTENSION: ICD-10-CM

## 2021-05-08 DIAGNOSIS — E78.2 MIXED HYPERLIPIDEMIA: ICD-10-CM

## 2021-05-10 RX ORDER — LISINOPRIL AND HYDROCHLOROTHIAZIDE 20; 12.5 MG/1; MG/1
1 TABLET ORAL DAILY
Qty: 30 TABLET | Refills: 0 | OUTPATIENT
Start: 2021-05-10

## 2021-05-10 NOTE — TELEPHONE ENCOUNTER
Received fax from pharmacy requesting refill on pts medication(s). Pt was last seen in office on 3/18/2020  and has a follow up scheduled for Visit date not found. Will send request to  Pharmacy  for patient.      Requested Prescriptions     Refused Prescriptions Disp Refills    lisinopril-hydroCHLOROthiazide (PRINZIDE;ZESTORETIC) 20-12.5 MG per tablet [Pharmacy Med Name: LISINOPRIL-HCTZ 20-12.5 MG TAB] 30 tablet 0     Sig: TAKE 1 TABLET BY MOUTH DAILY (MUST BE SEEN BEFORE FURTHER REFILLS)     Refused By: Bette Mcgee     Reason for Refusal: Patient needs an appointment

## 2021-06-23 ENCOUNTER — OFFICE VISIT (OUTPATIENT)
Dept: PRIMARY CARE CLINIC | Age: 56
End: 2021-06-23
Payer: COMMERCIAL

## 2021-06-23 VITALS
HEART RATE: 64 BPM | HEIGHT: 73 IN | SYSTOLIC BLOOD PRESSURE: 138 MMHG | OXYGEN SATURATION: 97 % | BODY MASS INDEX: 29.03 KG/M2 | WEIGHT: 219 LBS | DIASTOLIC BLOOD PRESSURE: 76 MMHG | TEMPERATURE: 98 F

## 2021-06-23 DIAGNOSIS — I21.4 NSTEMI (NON-ST ELEVATED MYOCARDIAL INFARCTION) (HCC): ICD-10-CM

## 2021-06-23 DIAGNOSIS — E78.2 MIXED HYPERLIPIDEMIA: ICD-10-CM

## 2021-06-23 DIAGNOSIS — F17.219 CIGARETTE NICOTINE DEPENDENCE WITH NICOTINE-INDUCED DISORDER: ICD-10-CM

## 2021-06-23 DIAGNOSIS — I10 ESSENTIAL HYPERTENSION: ICD-10-CM

## 2021-06-23 DIAGNOSIS — H69.81 DYSFUNCTION OF RIGHT EUSTACHIAN TUBE: ICD-10-CM

## 2021-06-23 DIAGNOSIS — Z12.11 COLON CANCER SCREENING: ICD-10-CM

## 2021-06-23 DIAGNOSIS — Z00.00 ROUTINE PHYSICAL EXAMINATION: Primary | ICD-10-CM

## 2021-06-23 PROCEDURE — 99406 BEHAV CHNG SMOKING 3-10 MIN: CPT | Performed by: NURSE PRACTITIONER

## 2021-06-23 PROCEDURE — 99396 PREV VISIT EST AGE 40-64: CPT | Performed by: NURSE PRACTITIONER

## 2021-06-23 RX ORDER — NICOTINE 21 MG/24HR
1 PATCH, TRANSDERMAL 24 HOURS TRANSDERMAL DAILY
Qty: 30 PATCH | Refills: 1 | Status: CANCELLED | OUTPATIENT
Start: 2021-06-23 | End: 2021-08-04

## 2021-06-23 RX ORDER — CARVEDILOL 6.25 MG/1
TABLET ORAL
Qty: 60 TABLET | Refills: 11 | Status: SHIPPED | OUTPATIENT
Start: 2021-06-23

## 2021-06-23 RX ORDER — ASPIRIN 81 MG/1
81 TABLET, CHEWABLE ORAL DAILY
Qty: 30 TABLET | Refills: 11 | Status: SHIPPED | OUTPATIENT
Start: 2021-06-23

## 2021-06-23 RX ORDER — FLUTICASONE PROPIONATE 50 MCG
2 SPRAY, SUSPENSION (ML) NASAL DAILY
Qty: 1 BOTTLE | Refills: 5 | Status: SHIPPED | OUTPATIENT
Start: 2021-06-23

## 2021-06-23 RX ORDER — LISINOPRIL AND HYDROCHLOROTHIAZIDE 20; 12.5 MG/1; MG/1
1 TABLET ORAL DAILY
Qty: 30 TABLET | Refills: 11 | Status: SHIPPED | OUTPATIENT
Start: 2021-06-23 | End: 2022-04-11

## 2021-06-23 RX ORDER — AMLODIPINE BESYLATE 5 MG/1
5 TABLET ORAL DAILY
Qty: 30 TABLET | Refills: 11 | Status: SHIPPED | OUTPATIENT
Start: 2021-06-23

## 2021-06-23 RX ORDER — ATORVASTATIN CALCIUM 40 MG/1
TABLET, FILM COATED ORAL
Qty: 30 TABLET | Refills: 11 | Status: SHIPPED | OUTPATIENT
Start: 2021-06-23

## 2021-06-23 SDOH — ECONOMIC STABILITY: FOOD INSECURITY: WITHIN THE PAST 12 MONTHS, YOU WORRIED THAT YOUR FOOD WOULD RUN OUT BEFORE YOU GOT MONEY TO BUY MORE.: NEVER TRUE

## 2021-06-23 SDOH — ECONOMIC STABILITY: FOOD INSECURITY: WITHIN THE PAST 12 MONTHS, THE FOOD YOU BOUGHT JUST DIDN'T LAST AND YOU DIDN'T HAVE MONEY TO GET MORE.: NEVER TRUE

## 2021-06-23 ASSESSMENT — ENCOUNTER SYMPTOMS
VOMITING: 0
CONSTIPATION: 0
DIARRHEA: 0
SHORTNESS OF BREATH: 0
TROUBLE SWALLOWING: 0
RHINORRHEA: 0
NAUSEA: 0
ABDOMINAL PAIN: 0
SORE THROAT: 0
COUGH: 0

## 2021-06-23 ASSESSMENT — PATIENT HEALTH QUESTIONNAIRE - PHQ9
SUM OF ALL RESPONSES TO PHQ QUESTIONS 1-9: 0
1. LITTLE INTEREST OR PLEASURE IN DOING THINGS: 0
SUM OF ALL RESPONSES TO PHQ QUESTIONS 1-9: 0
2. FEELING DOWN, DEPRESSED OR HOPELESS: 0
SUM OF ALL RESPONSES TO PHQ QUESTIONS 1-9: 0
SUM OF ALL RESPONSES TO PHQ9 QUESTIONS 1 & 2: 0

## 2021-06-23 ASSESSMENT — SOCIAL DETERMINANTS OF HEALTH (SDOH): HOW HARD IS IT FOR YOU TO PAY FOR THE VERY BASICS LIKE FOOD, HOUSING, MEDICAL CARE, AND HEATING?: NOT HARD AT ALL

## 2021-06-23 NOTE — PROGRESS NOTES
Rach Jerez (:  1965) is a 49 y.o. male,Established patient, here for evaluation of the following chief complaint(s): Annual Exam (here for physical and medication refills. )      ASSESSMENT/PLAN:    ICD-10-CM    1. Routine physical examination  Z00.00 CBC Auto Differential     Comprehensive Metabolic Panel     Lipid Panel     TSH without Reflex     T4, Free     Microalbumin / Creatinine Urine Ratio     PSA Screening     Hepatitis C Antibody     HIV Rapid 1&2     Meagan Bonilla MD, Gastroenterology, [place]     [place] TOBACCO USE CESSATION INTERMEDIATE 3-10 MINUTES   2. Essential hypertension  I10 carvedilol (COREG) 6.25 MG tablet     atorvastatin (LIPITOR) 40 MG tablet     lisinopril-hydroCHLOROthiazide (PRINZIDE;ZESTORETIC) 20-12.5 MG per tablet     amLODIPine (NORVASC) 5 MG tablet   3. Mixed hyperlipidemia  E78.2 carvedilol (COREG) 6.25 MG tablet     atorvastatin (LIPITOR) 40 MG tablet     lisinopril-hydroCHLOROthiazide (PRINZIDE;ZESTORETIC) 20-12.5 MG per tablet     amLODIPine (NORVASC) 5 MG tablet   4. NSTEMI (non-ST elevated myocardial infarction) (HCC)  I21.4 carvedilol (COREG) 6.25 MG tablet     atorvastatin (LIPITOR) 40 MG tablet     lisinopril-hydroCHLOROthiazide (PRINZIDE;ZESTORETIC) 20-12.5 MG per tablet     amLODIPine (NORVASC) 5 MG tablet   5. Cigarette nicotine dependence with nicotine-induced disorder  F17.219 [place] TOBACCO USE CESSATION INTERMEDIATE 3-10 MINUTES    Approximately 3 minutes of education was provided about quit smoking and the harms of tobacco.  Patient does show understanding. Patient does not have  the desire to quit smoking in the near future. 6. Colon cancer screening  Z12.11 Gera Olvera MD, Gastroenterology, [place]   7. Eustachian tube dysfunction, right         flonase     Declined shingles vaccine. Advised he needed to get it, pnuemonia and covid vaccinations.       Return in about 1 year (around 2022) for yearly physical. disturbance. The patient is not nervous/anxious. Physical Exam  Vitals reviewed. Constitutional:       Appearance: Normal appearance. He is well-developed. HENT:      Head: Normocephalic and atraumatic. Right Ear: Tympanic membrane, ear canal and external ear normal.      Left Ear: Tympanic membrane, ear canal and external ear normal.      Nose: Nose normal.      Mouth/Throat:      Mouth: Mucous membranes are moist.      Pharynx: Oropharynx is clear. Eyes:      General: No scleral icterus. Conjunctiva/sclera: Conjunctivae normal.      Pupils: Pupils are equal, round, and reactive to light. Neck:      Thyroid: No thyroid mass or thyromegaly. Vascular: No carotid bruit. Cardiovascular:      Rate and Rhythm: Normal rate and regular rhythm. Heart sounds: Normal heart sounds. No murmur heard. Pulmonary:      Effort: Pulmonary effort is normal.      Breath sounds: Normal breath sounds. Abdominal:      General: Bowel sounds are normal. There is no distension. Palpations: Abdomen is soft. Tenderness: There is no abdominal tenderness. There is no guarding or rebound. Genitourinary:     Comments: Exam deferred  Musculoskeletal:         General: Normal range of motion. Cervical back: Normal range of motion and neck supple. No edema. Comments: Joints without swelling or redness   Skin:     General: Skin is warm and dry. Findings: No rash. Neurological:      Mental Status: He is alert and oriented to person, place, and time. GCS: GCS eye subscore is 4. GCS verbal subscore is 5. GCS motor subscore is 6. Cranial Nerves: Cranial nerves are intact. Sensory: Sensation is intact. Motor: Motor function is intact. Coordination: Coordination normal.      Gait: Gait is intact. Psychiatric:         Mood and Affect: Mood normal.         Speech: Speech normal.         Behavior: Behavior normal.         Thought Content:  Thought content normal. Judgment: Judgment normal.                 An electronic signature was used to authenticate this note.     --IRMA Pope

## 2021-06-23 NOTE — PATIENT INSTRUCTIONS
They are sick less. For babies and small children, living smoke-free means they're less likely to have ear infections, pneumonia, and bronchitis. · If you're a woman who is or will be pregnant someday, quitting smoking means a healthier . · Children who are close to you are less likely to become adult smokers. Where can you learn more? Go to https://chpekhushiewemre.[email or web address] org and sign in to your Audience account. Enter [phone] in the [facility] box to learn more about \"Learning About Benefits From Quitting Smoking. \"     If you do not have an account, please click on the \"Sign Up Now\" link. Current as of: 2021               Content Version: 12.9   Healthwise, [employer]. Care instructions adapted under license by [facility] ([facility]). If you have questions about a medical condition or this instruction, always ask your healthcare professional. [place] [address] any warranty or liability for your use of this information. Patient Education        Well Visit, Men 48 to 72: Care Instructions  Overview     Well visits can help you stay healthy. Your doctor has checked your overall health and may have suggested ways to take good care of yourself. Your doctor also may have recommended tests. At home, you can help prevent illness with healthy eating, regular exercise, and other steps. Follow-up care is a key part of your treatment and safety. Be sure to make and go to all appointments, and call your doctor if you are having problems. It's also a good idea to know your test results and keep a list of the medicines you take. How can you care for yourself at home? · Get screening tests that you and your doctor decide on. Screening helps find diseases before any symptoms appear. · Eat healthy foods. Choose fruits, vegetables, whole grains, protein, and low-fat dairy foods. Limit fat, especially saturated fat. Reduce salt in your diet. · Limit alcohol. Have no more than 2 drinks a day or 14 drinks a week. · Get at least 30 minutes of exercise on most days of the week. Walking is a good choice. You also may want to do other activities, such as running, swimming, cycling, or playing tennis or team sports. · Reach and stay at a healthy weight. This will lower your risk for many problems, such as obesity, diabetes, heart disease, and high blood pressure. · Do not smoke. Smoking can make health problems worse. If you need help quitting, talk to your doctor about stop-smoking programs and medicines. These can increase your chances of quitting for good. · Care for your mental health. It is easy to get weighed down by worry and stress. Learn strategies to manage stress, like deep breathing and mindfulness, and stay connected with your family and community. If you find you often feel sad or hopeless, talk with your doctor. Treatment can help. · Talk to your doctor about whether you have any risk factors for sexually transmitted infections (STIs). You can help prevent STIs if you wait to have sex with a new partner (or partners) until you've each been tested for STIs. It also helps if you use condoms (male or female condoms) and if you limit your sex partners to one person who only has sex with you. Vaccines are available for some STIs. · If it's important to you to prevent pregnancy with your partner, talk with your doctor about birth control options that might be best for you. · If you think you may have a problem with alcohol or drug use, talk to your doctor. This includes prescription medicines (such as amphetamines and opioids) and illegal drugs (such as cocaine and methamphetamine). Your doctor can help you figure out what type of treatment is best for you. · Protect your skin from too much sun. When you're outdoors from 10 a.m. to 4 p.m., stay in the shade or cover up with clothing and a hat with a wide brim. Wear sunglasses that block UV rays.  Even when it's

## 2022-04-10 DIAGNOSIS — I10 ESSENTIAL HYPERTENSION: ICD-10-CM

## 2022-04-10 DIAGNOSIS — I21.4 NSTEMI (NON-ST ELEVATED MYOCARDIAL INFARCTION) (HCC): ICD-10-CM

## 2022-04-10 DIAGNOSIS — E78.2 MIXED HYPERLIPIDEMIA: ICD-10-CM

## 2022-04-11 RX ORDER — LISINOPRIL AND HYDROCHLOROTHIAZIDE 20; 12.5 MG/1; MG/1
TABLET ORAL
Qty: 90 TABLET | Refills: 3 | Status: SHIPPED | OUTPATIENT
Start: 2022-04-11

## 2022-04-11 NOTE — TELEPHONE ENCOUNTER
Requested Prescriptions     Pending Prescriptions Disp Refills    lisinopril-hydroCHLOROthiazide (PRINZIDE;ZESTORETIC) 20-12.5 MG per tablet [Pharmacy Med Name: Lisinopril-hydroCHLOROthiazide 20-12.5 MG Oral Tablet] 90 tablet 3     Sig: Take 1 tablet by mouth once daily

## 2025-01-22 ENCOUNTER — TELEPHONE (OUTPATIENT)
Age: 60
End: 2025-01-22

## 2025-01-22 ENCOUNTER — OFFICE VISIT (OUTPATIENT)
Age: 60
End: 2025-01-22

## 2025-01-22 VITALS
HEIGHT: 73 IN | OXYGEN SATURATION: 96 % | HEART RATE: 90 BPM | TEMPERATURE: 97 F | SYSTOLIC BLOOD PRESSURE: 164 MMHG | DIASTOLIC BLOOD PRESSURE: 110 MMHG | WEIGHT: 259 LBS | BODY MASS INDEX: 34.33 KG/M2

## 2025-01-22 DIAGNOSIS — M79.671 FOOT PAIN, BILATERAL: Primary | ICD-10-CM

## 2025-01-22 DIAGNOSIS — M79.672 FOOT PAIN, BILATERAL: Primary | ICD-10-CM

## 2025-01-22 DIAGNOSIS — I10 ESSENTIAL HYPERTENSION: ICD-10-CM

## 2025-01-22 LAB
ALBUMIN SERPL-MCNC: 4.6 G/DL (ref 3.5–5.2)
ALP SERPL-CCNC: 81 U/L (ref 40–129)
ALT SERPL-CCNC: 25 U/L (ref 5–41)
ANION GAP SERPL CALCULATED.3IONS-SCNC: 13 MMOL/L (ref 8–16)
AST SERPL-CCNC: 20 U/L (ref 5–40)
BILIRUB SERPL-MCNC: 0.3 MG/DL (ref 0.2–1.2)
BUN SERPL-MCNC: 27 MG/DL (ref 6–20)
CALCIUM SERPL-MCNC: 9.8 MG/DL (ref 8.6–10)
CHLORIDE SERPL-SCNC: 103 MMOL/L (ref 98–107)
CO2 SERPL-SCNC: 25 MMOL/L (ref 22–29)
CREAT SERPL-MCNC: 1.1 MG/DL (ref 0.7–1.2)
GLUCOSE SERPL-MCNC: 105 MG/DL (ref 70–99)
POTASSIUM SERPL-SCNC: 4.5 MMOL/L (ref 3.5–5.1)
PROT SERPL-MCNC: 7.7 G/DL (ref 6.4–8.3)
SODIUM SERPL-SCNC: 141 MMOL/L (ref 136–145)

## 2025-01-22 PROCEDURE — 3077F SYST BP >= 140 MM HG: CPT | Performed by: NURSE PRACTITIONER

## 2025-01-22 PROCEDURE — 99204 OFFICE O/P NEW MOD 45 MIN: CPT | Performed by: NURSE PRACTITIONER

## 2025-01-22 PROCEDURE — 3080F DIAST BP >= 90 MM HG: CPT | Performed by: NURSE PRACTITIONER

## 2025-01-22 RX ORDER — PREDNISONE 20 MG/1
20 TABLET ORAL 2 TIMES DAILY
Qty: 10 TABLET | Refills: 0 | Status: SHIPPED | OUTPATIENT
Start: 2025-01-22 | End: 2025-01-27

## 2025-01-22 RX ORDER — AMLODIPINE AND BENAZEPRIL HYDROCHLORIDE 10; 20 MG/1; MG/1
1 CAPSULE ORAL DAILY
Qty: 30 CAPSULE | Refills: 3 | Status: SHIPPED | OUTPATIENT
Start: 2025-01-22

## 2025-01-22 SDOH — ECONOMIC STABILITY: FOOD INSECURITY: WITHIN THE PAST 12 MONTHS, YOU WORRIED THAT YOUR FOOD WOULD RUN OUT BEFORE YOU GOT MONEY TO BUY MORE.: NEVER TRUE

## 2025-01-22 SDOH — ECONOMIC STABILITY: FOOD INSECURITY: WITHIN THE PAST 12 MONTHS, THE FOOD YOU BOUGHT JUST DIDN'T LAST AND YOU DIDN'T HAVE MONEY TO GET MORE.: NEVER TRUE

## 2025-01-22 ASSESSMENT — PATIENT HEALTH QUESTIONNAIRE - PHQ9
SUM OF ALL RESPONSES TO PHQ QUESTIONS 1-9: 0
10. IF YOU CHECKED OFF ANY PROBLEMS, HOW DIFFICULT HAVE THESE PROBLEMS MADE IT FOR YOU TO DO YOUR WORK, TAKE CARE OF THINGS AT HOME, OR GET ALONG WITH OTHER PEOPLE: NOT DIFFICULT AT ALL
9. THOUGHTS THAT YOU WOULD BE BETTER OFF DEAD, OR OF HURTING YOURSELF: NOT AT ALL
SUM OF ALL RESPONSES TO PHQ QUESTIONS 1-9: 0
3. TROUBLE FALLING OR STAYING ASLEEP: NOT AT ALL
SUM OF ALL RESPONSES TO PHQ QUESTIONS 1-9: 0
7. TROUBLE CONCENTRATING ON THINGS, SUCH AS READING THE NEWSPAPER OR WATCHING TELEVISION: NOT AT ALL
8. MOVING OR SPEAKING SO SLOWLY THAT OTHER PEOPLE COULD HAVE NOTICED. OR THE OPPOSITE, BEING SO FIGETY OR RESTLESS THAT YOU HAVE BEEN MOVING AROUND A LOT MORE THAN USUAL: NOT AT ALL
1. LITTLE INTEREST OR PLEASURE IN DOING THINGS: NOT AT ALL
5. POOR APPETITE OR OVEREATING: NOT AT ALL
SUM OF ALL RESPONSES TO PHQ9 QUESTIONS 1 & 2: 0
6. FEELING BAD ABOUT YOURSELF - OR THAT YOU ARE A FAILURE OR HAVE LET YOURSELF OR YOUR FAMILY DOWN: NOT AT ALL
SUM OF ALL RESPONSES TO PHQ QUESTIONS 1-9: 0
2. FEELING DOWN, DEPRESSED OR HOPELESS: NOT AT ALL
4. FEELING TIRED OR HAVING LITTLE ENERGY: NOT AT ALL

## 2025-01-22 ASSESSMENT — ENCOUNTER SYMPTOMS
CONSTIPATION: 0
COUGH: 0
SORE THROAT: 0
SHORTNESS OF BREATH: 0
ABDOMINAL PAIN: 0
NAUSEA: 0
TROUBLE SWALLOWING: 0
RHINORRHEA: 0
VOMITING: 0
DIARRHEA: 0

## 2025-01-22 NOTE — TELEPHONE ENCOUNTER
I have attempted to contact this patient by phone with the following results: no answer and not able to leave voicemail. Will try again later.

## 2025-01-22 NOTE — PROGRESS NOTES
Musculoskeletal:         General: Normal range of motion.      Cervical back: Normal range of motion and neck supple.      Right foot: Normal range of motion. Tenderness (first MTP joint, no warmth or redness.) present.   Skin:     General: Skin is warm.   Neurological:      General: No focal deficit present.      Mental Status: He is alert.             The patient (or guardian, if applicable) and other individuals in attendance with the patient were advised that Artificial Intelligence will be utilized during this visit to record, process the conversation to generate a clinical note and to support improvement of the AI technology. The patient (or guardian, if applicable) and other individuals in attendance at the appointment consented to the use of AI, including the recording.      An electronic signature was used to authenticate this note.    --IRMA Morelos

## 2025-01-22 NOTE — TELEPHONE ENCOUNTER
----- Message from IRMA Arellano sent at 1/22/2025  1:54 PM CST -----  Please call patient and let them know results.   Metabolic panel is normal.  It is suggestive of some slight dehydration.  Recommend increasing water intake